# Patient Record
Sex: FEMALE | Race: WHITE | Employment: UNEMPLOYED | URBAN - NONMETROPOLITAN AREA
[De-identification: names, ages, dates, MRNs, and addresses within clinical notes are randomized per-mention and may not be internally consistent; named-entity substitution may affect disease eponyms.]

---

## 2022-01-03 ENCOUNTER — TELEPHONE (OUTPATIENT)
Dept: FAMILY MEDICINE CLINIC | Age: 67
End: 2022-01-03

## 2022-01-03 NOTE — TELEPHONE ENCOUNTER
Patient presented to window at 2:30pm for 1:20pm appt. Had times mixed up. Rescheduled for next available, 1/17/22.

## 2022-01-17 ENCOUNTER — OFFICE VISIT (OUTPATIENT)
Dept: FAMILY MEDICINE CLINIC | Age: 67
End: 2022-01-17
Payer: MEDICARE

## 2022-01-17 VITALS
BODY MASS INDEX: 20.02 KG/M2 | HEIGHT: 57 IN | SYSTOLIC BLOOD PRESSURE: 102 MMHG | HEART RATE: 70 BPM | OXYGEN SATURATION: 98 % | DIASTOLIC BLOOD PRESSURE: 60 MMHG | WEIGHT: 92.8 LBS | TEMPERATURE: 97.2 F

## 2022-01-17 DIAGNOSIS — F32.A DEPRESSION, UNSPECIFIED DEPRESSION TYPE: ICD-10-CM

## 2022-01-17 DIAGNOSIS — Z12.31 ENCOUNTER FOR SCREENING MAMMOGRAM FOR BREAST CANCER: ICD-10-CM

## 2022-01-17 DIAGNOSIS — Z13.1 DIABETES MELLITUS SCREENING: ICD-10-CM

## 2022-01-17 DIAGNOSIS — Z78.0 POST-MENOPAUSAL: ICD-10-CM

## 2022-01-17 DIAGNOSIS — F41.9 ANXIETY: Primary | ICD-10-CM

## 2022-01-17 DIAGNOSIS — N89.8 VAGINAL DISCHARGE: ICD-10-CM

## 2022-01-17 DIAGNOSIS — Z13.220 LIPID SCREENING: ICD-10-CM

## 2022-01-17 DIAGNOSIS — M77.8 RIGHT SHOULDER TENDINITIS: ICD-10-CM

## 2022-01-17 DIAGNOSIS — Z12.11 SCREENING FOR COLON CANCER: ICD-10-CM

## 2022-01-17 PROCEDURE — G8420 CALC BMI NORM PARAMETERS: HCPCS | Performed by: NURSE PRACTITIONER

## 2022-01-17 PROCEDURE — 4040F PNEUMOC VAC/ADMIN/RCVD: CPT | Performed by: NURSE PRACTITIONER

## 2022-01-17 PROCEDURE — 3017F COLORECTAL CA SCREEN DOC REV: CPT | Performed by: NURSE PRACTITIONER

## 2022-01-17 PROCEDURE — 99212 OFFICE O/P EST SF 10 MIN: CPT | Performed by: NURSE PRACTITIONER

## 2022-01-17 PROCEDURE — 4004F PT TOBACCO SCREEN RCVD TLK: CPT | Performed by: NURSE PRACTITIONER

## 2022-01-17 PROCEDURE — G8484 FLU IMMUNIZE NO ADMIN: HCPCS | Performed by: NURSE PRACTITIONER

## 2022-01-17 PROCEDURE — G8427 DOCREV CUR MEDS BY ELIG CLIN: HCPCS | Performed by: NURSE PRACTITIONER

## 2022-01-17 PROCEDURE — 1123F ACP DISCUSS/DSCN MKR DOCD: CPT | Performed by: NURSE PRACTITIONER

## 2022-01-17 PROCEDURE — 99203 OFFICE O/P NEW LOW 30 MIN: CPT | Performed by: NURSE PRACTITIONER

## 2022-01-17 PROCEDURE — G8400 PT W/DXA NO RESULTS DOC: HCPCS | Performed by: NURSE PRACTITIONER

## 2022-01-17 PROCEDURE — 1090F PRES/ABSN URINE INCON ASSESS: CPT | Performed by: NURSE PRACTITIONER

## 2022-01-17 ASSESSMENT — PATIENT HEALTH QUESTIONNAIRE - PHQ9
SUM OF ALL RESPONSES TO PHQ QUESTIONS 1-9: 0
2. FEELING DOWN, DEPRESSED OR HOPELESS: 0
SUM OF ALL RESPONSES TO PHQ QUESTIONS 1-9: 0
SUM OF ALL RESPONSES TO PHQ9 QUESTIONS 1 & 2: 0
SUM OF ALL RESPONSES TO PHQ QUESTIONS 1-9: 0
SUM OF ALL RESPONSES TO PHQ QUESTIONS 1-9: 0
1. LITTLE INTEREST OR PLEASURE IN DOING THINGS: 0

## 2022-01-17 NOTE — PROGRESS NOTES
[address] [facility]  [address] [facility], [id number]  [phone]      HPI:     Shoulder Pain   The pain is present in the right arm and right shoulder. This is a new problem. The current episode started more than 1 month ago. There has been no history of extremity trauma. The problem occurs daily. The problem has been unchanged. The quality of the pain is described as aching and sharp. The pain is at a severity of 5/10. The pain is moderate. Associated symptoms include a limited range of motion and stiffness. Pertinent negatives include no fever, numbness or tingling. The symptoms are aggravated by activity. She has tried acetaminophen and NSAIDS for the symptoms. The treatment provided no relief. Pt presents to the clinic to establish care. She has a significant history of anxiety and depression. She has a history of bipolar disease but does not think that she is having mood swings like she was. She denies thoughts of suicide or homicide. She was seeing a counselor and psychiatrist in the past but has not done so for several years. She is struggling with her anxiety and having trouble sleeping. She does report vaginal drainage over the last few months. There is no color or smell to the drainage. She denies itching or painful intercourse. She is due for a pap. She is due for labs. She is agreeable to update health maintenance. Current Outpatient Medications   Medication Sig Dispense Refill    ibuprofen (ADVIL;MOTRIN) 800 MG tablet Take 1 tablet by mouth every 8 hours as needed for Pain (with food). (Patient not taking: Reported on 1/17/2022) 90 tablet 0     No current facility-administered medications for this visit. No Known Allergies    All patients pastmedical, surgical, social and family history has been reviewed. Subjective:      Review of Systems   Constitutional: Negative for activity change, appetite change, fatigue and fever.    Respiratory: Negative for cough, shortness of breath and wheezing. Cardiovascular: Negative for chest pain and palpitations. Musculoskeletal: Positive for stiffness. Right shoulder pain   Skin: Negative. Neurological: Negative for dizziness, tingling, light-headedness, numbness and headaches. Psychiatric/Behavioral: Positive for sleep disturbance. Negative for self-injury and suicidal ideas. The patient is nervous/anxious. Objective:      Physical Exam  Vitals and nursing note reviewed. Constitutional:       Appearance: Normal appearance. HENT:      Head: Normocephalic and atraumatic. Cardiovascular:      Rate and Rhythm: Normal rate and regular rhythm. Heart sounds: Normal heart sounds. Pulmonary:      Effort: Pulmonary effort is normal.      Breath sounds: Normal breath sounds. Musculoskeletal:      Right shoulder: Tenderness present. Decreased range of motion. Decreased strength. Skin:     Capillary Refill: Capillary refill takes less than 2 seconds. Neurological:      General: No focal deficit present. Mental Status: She is alert and oriented to person, place, and time. Psychiatric:         Mood and Affect: Mood is anxious. Behavior: Behavior normal.         Thought Content: Thought content normal.         Cognition and Memory: Cognition and memory normal.         Judgment: Judgment normal.          Assessment:       Diagnosis Orders   1. Anxiety  TSH with Reflex    External Referral To Psychiatry   2. Depression, unspecified depression type  External Referral To Psychiatry   3. Right shoulder tendinitis  [facility] [place]   4. Post-menopausal  DEXA Bone Density Axial Skeleton    CBC Auto Differential   5. Vaginal discharge  [facility]   6. Lipid screening  Lipid Panel   7. Diabetes mellitus screening  Comprehensive Metabolic Panel   8. Encounter for screening mammogram for breast cancer  [facility] PARVIN DIGITAL SCREEN BILATERAL [[id number]]   9.  Screening for colon cancer  Fecal DNA Colorectal cancer screening (Cologuard)       Plan:      Anxiety-will refer to psychiatry given pt's history and intolerance of different medications  Depression-same as above labs ordered  Right shoulder tendinitis-will refer to PT for eval  Post-menopausal-dexa scan ordered  Vaginal discharge-refer to OB/GYN for pap  fasting labs ordered  Screening mammogram ordered  cologuard ordered  Pt to return in 3 months for follow up   Pt to return PRN   Return in about 3 months (around 4/17/2022).   Orders Placed This Encounter   Procedures    Fecal DNA Colorectal cancer screening (Cologuard)    [facility] PARVIN DIGITAL SCREEN BILATERAL [[id number]]     Standing Status:   Future     Standing Expiration Date:   1/17/2023     Order Specific Question:   Reason for exam:     Answer:   screening    DEXA Bone Density Axial Skeleton     Standing Status:   Future     Standing Expiration Date:   1/17/2023     Order Specific Question:   Reason for exam:     Answer:   post-menopausal    CBC Auto Differential     Standing Status:   Future     Standing Expiration Date:   1/18/2023    Comprehensive Metabolic Panel     Standing Status:   Future     Standing Expiration Date:   1/18/2023    Lipid Panel     Standing Status:   Future     Standing Expiration Date:   1/18/2023     Order Specific Question:   Is Patient Fasting?/# of Hours     Answer:   15    TSH with Reflex     Standing Status:   Future     Standing Expiration Date:   1/18/2023    External Referral To Psychiatry     Referral Priority:   Routine     Referral Type:   Eval and Treat     Referral Reason:   Specialty Services Required     Requested Specialty:   Psychiatry     Number of Visits Requested:   [facility]     Referral Priority:   Routine     Referral Type:   Eval and Treat     Referral Reason:   Specialty Services Required     Requested Specialty:   Obstetrics & Gynecology     Number of Visits Requested:   [address] [place] [address] Therapy [facility] [place]     Referral Priority:   Routine     Referral Type:   Eval and Treat     Referral Reason:   Specialty Services Required     Requested Specialty:   Physical Therapy     Number of Visits Requested:   1     No orders of the defined types were placed in this encounter. Patient given educational materials - see patient instructions. All patient questionsanswered. Pt voiced understanding. Reviewed health maintenance.      Electronically signed by ALFONZO Mckay CNP, CNP on 1/24/2022 at 11:59 AM

## 2022-01-20 ENCOUNTER — HOSPITAL ENCOUNTER (OUTPATIENT)
Dept: PHYSICAL THERAPY | Age: 67
Setting detail: THERAPIES SERIES
Discharge: HOME | End: 2022-01-20
Payer: MEDICARE

## 2022-01-20 PROCEDURE — 97110 THERAPEUTIC EXERCISES: CPT | Performed by: PHYSICAL THERAPIST

## 2022-01-20 PROCEDURE — 97161 PT EVAL LOW COMPLEX 20 MIN: CPT | Performed by: PHYSICAL THERAPIST

## 2022-01-20 ASSESSMENT — PAIN DESCRIPTION - PAIN TYPE: TYPE: CHRONIC PAIN

## 2022-01-20 ASSESSMENT — PAIN DESCRIPTION - FREQUENCY: FREQUENCY: CONTINUOUS

## 2022-01-20 ASSESSMENT — PAIN DESCRIPTION - ORIENTATION: ORIENTATION: RIGHT

## 2022-01-20 ASSESSMENT — PAIN DESCRIPTION - DESCRIPTORS: DESCRIPTORS: ACHING;THROBBING

## 2022-01-20 ASSESSMENT — PAIN DESCRIPTION - ONSET: ONSET: PROGRESSIVE

## 2022-01-20 ASSESSMENT — PAIN DESCRIPTION - LOCATION: LOCATION: ARM;SHOULDER

## 2022-01-20 ASSESSMENT — PAIN - FUNCTIONAL ASSESSMENT: PAIN_FUNCTIONAL_ASSESSMENT: PREVENTS OR INTERFERES WITH ALL ACTIVE AND SOME PASSIVE ACTIVITIES

## 2022-01-20 ASSESSMENT — PAIN DESCRIPTION - PROGRESSION: CLINICAL_PROGRESSION: GRADUALLY WORSENING

## 2022-01-20 ASSESSMENT — PAIN SCALES - GENERAL: PAINLEVEL_OUTOF10: 9
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
## 2022-01-20 NOTE — PLAN OF CARE
[facility]    [x] [place]  Phone: [phone]  Fax: [phone]      [] [place]  Phone: [phone]  Fax: [phone]        To: Referring Practitioner: Niki Marcelo CNP      Patient: Marlyn Shetty  : 1955   MRN: [id number]  Evaluation Date: 2022      Diagnosis Information:  · Diagnosis: M77.8 R shld tendinitis   · Treatment Diagnosis: M77.8 L shld tendinitis     Physical Therapy Certification Form  Dear Niki Marcelo CNP  The following patient has been evaluated for physical therapy services and for therapy to continue, Medicare requires monthly physician review of the treatment plan. Please review the attached evaluation and/or summary of the patient's plan of care, and verify that you agree therapy should continue by signing the attached document and sending it back to our office.     Plan of Care/Treatment to date:  [x] Therapeutic Exercise    [] Modalities:  [] Therapeutic Activity     [] Ultrasound  [x] Electrical Stimulation  [] Gait Training      [] Cervical Traction [] Lumbar Traction  [] Neuromuscular Re-education    [] Cold/hotpack [] Iontophoresis   [x] Instruction in HEP     Other:  [x] Manual Therapy      []             [] Aquatic Therapy      []                 Goals:  Short term goals  Time Frame for Short term goals: 1 week  Short term goal 1: Start HEP    Long term goals  Time Frame for Long term goals : 4 week  Long term goal 1: R shld pain controlled at 2/10 to allow regular ADL  Long term goal 2: AROM 170 deg flex, abd, behind the back to T11, behind the head to T3 for efficient ADL  Long term goal 3: 4+/5 strength for proper lifting tolerance  Long term goal 4: Sleep thru 90% of the night    Frequency/Duration: 22 - 22  # Days per week: [] 1 day # Weeks: [] 1 week [] 5 weeks     [] 2 days   [] 2 weeks [] 6 weeks     [x] 3 days   [] 3 weeks [] 7 weeks     [] 4 days   [x] 4 weeks [] 8 weeks    Rehab Potential: [] Excellent [x] Good [] Fair  [] Poor     Electronically signed by:  Pat Munguia PT      If you have any questions or concerns, please don't hesitate to call.   Thank you for your referral.      Physician Signature:________________________________Date:__________________  By signing above, therapists plan is approved by physician

## 2022-01-20 NOTE — FLOWSHEET NOTE
Physical Therapy Daily Treatment Note    Date:  2022    Patient Name:  Marley Post    :  1955  MRN: [id number]  Restrictions/Precautions:     Medical/Treatment Diagnosis Information:   · Diagnosis: M77.8 R shld tendinitis  · Treatment Diagnosis: M77.8 L shld tendinitis  Insurance/Certification information:  PT Insurance Information: [facility] Medicare  Physician Information:  Referring Practitioner: Anna Avalos CNP  Plan of care signed (Y/N):  n  Visit# / total visits:   Pain level: 10/10       Time In:3:00   Time Out:3:40    Progress Note: [x]  Yes  []  No  Next due by: Visit #12, or 22      Subjective:  See eval     Objective: See eval  Observation:   Test measurements:      Exercises:   Exercise/Equipment Resistance/Repetitions Other comments   Supine pect stretch 3' On  roll   Shld extension stretch 10x    Int rotation stretch 10x    B rowing/ extension     6-way t-band     Flex to 90 deg     Scaption up/down          Prone scap program      Ext rotate in sidely 15x    Abd in sidely 10x         ROM          [x] Provided verbal/tactile cueing for activities related to strengthening, flexibility, endurance, ROM. (10460)  [] Provided verbal/tactile cueing for activities related to improving balance, coordination, kinesthetic sense, posture, motor skill, proprioception. (66651)    Therapeutic Activities:     [] Therapeutic activities, direct (one-on-one) patient contact (use of dynamic activities to improve functional performance). (11102)    Gait:   [] Provided training and instruction to the patient for ambulation re-education. (59314)    Self-Care/ADL's  [] Self-care/home management training and compensatory training, meal preparation, safety procedures, and instructions in use of assistive technology devices/adaptive equipment, direct one-on-one contact.  (57195)    Home Exercise Program: Impingement reduction program : pect, ext, int rot stretch    [x] Reviewed/Progressed HEP activities related to strengthening, flexibility, endurance, ROM. (73281)  [] Reviewed/Progressed HEP activities related to improving balance, coordination, kinesthetic sense, posture, motor skill, proprioception.  (61172)    Manual Treatments:    [] Provided manual therapy to mobilize soft tissue/joints for the purpose of modulating pain, promoting relaxation,  increasing ROM, reducing/eliminating soft tissue swelling/inflammation/restriction, improving soft tissue extensibility. (13641)    Service Based Modalities:  Eval 25'    Timed Code Treatment Minutes:    There ex/ HEP 15'    Total Treatment Minutes:  36'     Treatment/Activity Tolerance:  [x] Patient tolerated treatment well [] Patient limited by fatique  [] Patient limited by pain  [] Patient limited by other medical complications  [] Other:     Prognosis: [x] Good [] Fair  [] Poor    Patient Requires Follow-up: [x] Yes  [] No      Goals:  Short term goals  Time Frame for Short term goals: 1 week  Short term goal 1: Start HEP    Long term goals  Time Frame for Long term goals : 4 week  Long term goal 1: R shld pain controlled at 2/10 to allow regular ADL  Long term goal 2: AROM 170 deg flex, abd, behind the back to T11, behind the head to T3 for efficient ADL  Long term goal 3: 4+/5 strength for proper lifting tolerance  Long term goal 4: Sleep thru 90% of the night          Plan:   [] Continue per plan of care [] Alter current plan (see comments)  [x] Plan of care initiated [] Hold pending MD visit [] Discharge  Plan for Next Session:  VIA [facility] [place]    Electronically signed by:  Kike Camp PT,PT

## 2022-01-24 ENCOUNTER — HOSPITAL ENCOUNTER (OUTPATIENT)
Dept: PHYSICAL THERAPY | Age: 67
Setting detail: THERAPIES SERIES
Discharge: HOME | End: 2022-01-24
Payer: MEDICARE

## 2022-01-24 PROCEDURE — 97110 THERAPEUTIC EXERCISES: CPT

## 2022-01-24 ASSESSMENT — ENCOUNTER SYMPTOMS
COUGH: 0
WHEEZING: 0
SHORTNESS OF BREATH: 0

## 2022-01-24 NOTE — FLOWSHEET NOTE
Physical Therapy Daily Treatment Note    Date:  2022    Patient Name:  Renny Sevilla    :  1955  MRN: [id number]  Restrictions/Precautions:     Medical/Treatment Diagnosis Information:   · Diagnosis: M77.8 R shld tendinitis  · Treatment Diagnosis: M77.8 L shld tendinitis  Insurance/Certification information:  PT Insurance Information: [facility] Medicare  Physician Information:  Referring Practitioner: James Pacheco CNP  Plan of care signed (Y/N):  y  Visit# / total visits:   Pain level: 0/10       Time In: 1:10   Time Out:1:41    Progress Note: []  Yes  [x]  No  Next due by: Visit #12, or 22      Subjective:  Pt states has been completing HEP. Pt states shoulder feels good this date. Objective: YRN performed per flow sheet for increased mobility and strengthening to decrease pain and improve daily living tasks. Verbal cueing provided for sequencing and proper form. Pt declined initiation of IFC this date due to no pain. ADD  Shoulder endurance exercises     Observation:    Test measurements:  AROM R shoulder ER: C5     Exercises:   Exercise/Equipment Resistance/Repetitions Other comments   Supine pect stretch 3' On  roll   Shld extension stretch 10x    Int rotation stretch 10x    B rowing/ extension 10x red    6-way t-band 10x red    Flex to 90 deg 10x    Scaption up/down 10x         Prone scap program 10x     Ext rotate in sidely 15x    Abd in sidely 15x    Supine ABCs 1x              ROM 5'         [x] Provided verbal/tactile cueing for activities related to strengthening, flexibility, endurance, ROM. (47306)  [] Provided verbal/tactile cueing for activities related to improving balance, coordination, kinesthetic sense, posture, motor skill, proprioception. (34288)    Therapeutic Activities:     [] Therapeutic activities, direct (one-on-one) patient contact (use of dynamic activities to improve functional performance).  (04410)    Gait:   [] Provided training and instruction to the patient for ambulation re-education. (18721)    Self-Care/ADL's  [] Self-care/home management training and compensatory training, meal preparation, safety procedures, and instructions in use of assistive technology devices/adaptive equipment, direct one-on-one contact. (99526)    Home Exercise Program: Impingement reduction program : pect, ext, int rot stretch    [x] Reviewed/Progressed HEP activities related to strengthening, flexibility, endurance, ROM. (58376)  [] Reviewed/Progressed HEP activities related to improving balance, coordination, kinesthetic sense, posture, motor skill, proprioception.  (93649)    Manual Treatments:    [] Provided manual therapy to mobilize soft tissue/joints for the purpose of modulating pain, promoting relaxation,  increasing ROM, reducing/eliminating soft tissue swelling/inflammation/restriction, improving soft tissue extensibility. (15022)    Service Based Modalities:      Timed Code Treatment Minutes:    There ex 31'    Total Treatment Minutes:  32'     Treatment/Activity Tolerance:  [x] Patient tolerated treatment well [] Patient limited by fatique  [] Patient limited by pain  [] Patient limited by other medical complications  [] Other:     Prognosis: [x] Good [] Fair  [] Poor    Patient Requires Follow-up: [x] Yes  [] No      Goals:  Short term goals  Time Frame for Short term goals: 1 week  Short term goal 1: Start HEP    Long term goals  Time Frame for Long term goals : 4 week  Long term goal 1: R shld pain controlled at 2/10 to allow regular ADL  Long term goal 2: AROM 170 deg flex, abd, behind the back to T11, behind the head to T3 for efficient ADL  Long term goal 3: 4+/5 strength for proper lifting tolerance  Long term goal 4: Sleep thru 90% of the night          Plan:   [x] Continue per plan of care [] Alter current plan (see comments)  [] Plan of care initiated [] Hold pending MD visit [] Discharge    Plan for Next Session:  VIA [facility] [place]    Electronically signed by:  Alvarado Marlow Pérez, PTA

## 2022-01-25 NOTE — PROGRESS NOTES
I have reviewed and agree to the content of the note written by the PTA.   Electronically signed by Nicky Councilman PT [id number]

## 2022-01-26 ENCOUNTER — HOSPITAL ENCOUNTER (OUTPATIENT)
Dept: PHYSICAL THERAPY | Age: 67
Setting detail: THERAPIES SERIES
Discharge: HOME | End: 2022-01-26
Payer: MEDICARE

## 2022-01-26 DIAGNOSIS — Z13.6 ENCOUNTER FOR SCREENING FOR CARDIOVASCULAR DISORDERS: ICD-10-CM

## 2022-01-26 DIAGNOSIS — Z83.438 FAMILY HISTORY OF MIXED HYPERLIPIDEMIA: ICD-10-CM

## 2022-01-26 DIAGNOSIS — Z78.0 POST-MENOPAUSAL: ICD-10-CM

## 2022-01-26 DIAGNOSIS — N89.8 VAGINAL DISCHARGE: Primary | ICD-10-CM

## 2022-01-26 PROCEDURE — 97110 THERAPEUTIC EXERCISES: CPT

## 2022-01-26 NOTE — FLOWSHEET NOTE
Physical Therapy Daily Treatment Note    Date:  2022    Patient Name:  Brittany Augustin    :  1955  MRN: [id number]  Restrictions/Precautions:     Medical/Treatment Diagnosis Information:   · Diagnosis: M77.8 R shld tendinitis  · Treatment Diagnosis: M77.8 L shld tendinitis  Insurance/Certification information:  PT Insurance Information: [facility] Medicare  Physician Information:  Referring Practitioner: Hayden Lyn CNP  Plan of care signed (Y/N):  y  Visit# / total visits:  3/12  Pain level: 0/10       Time In: 1:50   Time Out: 2:16    Progress Note: []  Yes  [x]  No  Next due by: Visit #12, or 22      Subjective:  Pt states has been completing HEP. No pain on this date. Notes since she started she has had a lot less pain and can tell it is working. Objective: YRN performed per flow sheet for increased mobility and strengthening to decrease pain and improve daily living tasks. Patient tolerated added 1# to exercises. No rest breaks required. Observation:    Test measurements:  AROM R shoulder ER: C5     Exercises:   Exercise/Equipment Resistance/Repetitions Other comments   Supine pect stretch 3' On  roll   Shld extension stretch 10x    Int rotation stretch 10x    B rowing/ extension 10x red    6-way t-band 10x red    Flex to 90 deg 10x, 1#    Scaption up/down 10x, 1#         Prone scap program 10x, 1#    Ext rotate in sidely 15x, 1#    Abd in sidely 15x, 1#    Supine ABCs 1x, 1#    Supine overhead reach 15x         ROM 5'         [x] Provided verbal/tactile cueing for activities related to strengthening, flexibility, endurance, ROM. (52686)  [] Provided verbal/tactile cueing for activities related to improving balance, coordination, kinesthetic sense, posture, motor skill, proprioception. (96257)    Therapeutic Activities:     [] Therapeutic activities, direct (one-on-one) patient contact (use of dynamic activities to improve functional performance).  (22886)    Gait:   [] Provided training and instruction to the patient for ambulation re-education. (17206)    Self-Care/ADL's  [] Self-care/home management training and compensatory training, meal preparation, safety procedures, and instructions in use of assistive technology devices/adaptive equipment, direct one-on-one contact. (52865)    Home Exercise Program: Impingement reduction program : pect, ext, int rot stretch    [x] Reviewed/Progressed HEP activities related to strengthening, flexibility, endurance, ROM. (13246)  [] Reviewed/Progressed HEP activities related to improving balance, coordination, kinesthetic sense, posture, motor skill, proprioception.  (18241)    Manual Treatments:    [] Provided manual therapy to mobilize soft tissue/joints for the purpose of modulating pain, promoting relaxation,  increasing ROM, reducing/eliminating soft tissue swelling/inflammation/restriction, improving soft tissue extensibility. (05266)    Service Based Modalities:      Timed Code Treatment Minutes:    There ex 26'    Total Treatment Minutes:  26'     Treatment/Activity Tolerance:  [x] Patient tolerated treatment well [] Patient limited by fatique  [] Patient limited by pain  [] Patient limited by other medical complications  [] Other:     Prognosis: [x] Good [] Fair  [] Poor    Patient Requires Follow-up: [x] Yes  [] No      Goals:  Short term goals  Time Frame for Short term goals: 1 week  Short term goal 1: Start HEP    Long term goals  Time Frame for Long term goals : 4 week  Long term goal 1: R shld pain controlled at 2/10 to allow regular ADL (no pain)  Long term goal 2: AROM 170 deg flex, abd, behind the back to T11, behind the head to T3 for efficient ADL  Long term goal 3: 4+/5 strength for proper lifting tolerance (ongoing)  Long term goal 4: Sleep thru 90% of the night (not waking from pain)      Plan:   [x] Continue per plan of care [] Alter current plan (see comments)  [] Plan of care initiated [] Hold pending MD visit [] Discharge    Plan for Next Session:      Electronically signed by:  Bert Godinez PTA

## 2022-01-27 NOTE — PROGRESS NOTES
I have reviewed and agree to the content of the note written by the PTA.   Electronically signed by Gina Shea PT [id number]

## 2022-01-28 ENCOUNTER — HOSPITAL ENCOUNTER (OUTPATIENT)
Dept: PHYSICAL THERAPY | Age: 67
Setting detail: THERAPIES SERIES
Discharge: HOME | End: 2022-01-28
Payer: MEDICARE

## 2022-01-28 PROCEDURE — 97110 THERAPEUTIC EXERCISES: CPT

## 2022-01-28 NOTE — FLOWSHEET NOTE
Physical Therapy Daily Treatment Note    Date:  2022    Patient Name:  Candace Daily    :  1955  MRN: [id number]  Restrictions/Precautions:     Medical/Treatment Diagnosis Information:   · Diagnosis: M77.8 R shld tendinitis  · Treatment Diagnosis: M77.8 L shld tendinitis  Insurance/Certification information:  PT Insurance Information: [place] [facility]  Physician Information:  Referring Practitioner: Perlita Vinson CNP  Plan of care signed (Y/N):  y  Visit# / total visits:    Pain level: 0/10       Time In: 11:00   Time Out: 11:28    Progress Note: []  Yes  [x]  No  Next due by: Visit #12, or 22      Subjective:  Pt states has been completing HEP. No pain on this date. Notes since she started she has had a lot less pain and can tell it is working. Objective: YRN performed per flow sheet for increased mobility and strengthening to decrease pain and improve daily living tasks. No rest breaks required. Increased reps this date. Pt fatigued some with prone scap program this date. Spoke with pt about ROM being WNL and focusing now on shoulder strength and endurance      Observation:    Test measurements:  AROM R shoulder all WNL and no pain    Exercises:   Exercise/Equipment Resistance/Repetitions Other comments   Supine pect stretch 3' On /2 roll   Shld extension stretch 10x  on E stim box   Int rotation stretch 10x    B rowing/ extension 15x red    6-way t-band 15x red    Flex to 90 deg 15x, 1#    Scaption up/down 15x, 1#         Prone scap program 15x, 1#    Ext rotate in sidely 15x, 1#    Abd in sidely 15x, 1#    Supine ABCs 1x, 1#    Supine overhead reach 15x    Serratus punches 10x         ROM 5'         [x] Provided verbal/tactile cueing for activities related to strengthening, flexibility, endurance, ROM. (88418)  [] Provided verbal/tactile cueing for activities related to improving balance, coordination, kinesthetic sense, posture, motor skill, proprioception. (48614)    Therapeutic Activities:     [] Therapeutic activities, direct (one-on-one) patient contact (use of dynamic activities to improve functional performance). (26923)    Gait:   [] Provided training and instruction to the patient for ambulation re-education. (71055)    Self-Care/ADL's  [] Self-care/home management training and compensatory training, meal preparation, safety procedures, and instructions in use of assistive technology devices/adaptive equipment, direct one-on-one contact. (67838)    Home Exercise Program: Impingement reduction program : pect, ext, int rot stretch    [x] Reviewed/Progressed HEP activities related to strengthening, flexibility, endurance, ROM. (46549)  [] Reviewed/Progressed HEP activities related to improving balance, coordination, kinesthetic sense, posture, motor skill, proprioception.  (99164)    Manual Treatments:    [] Provided manual therapy to mobilize soft tissue/joints for the purpose of modulating pain, promoting relaxation,  increasing ROM, reducing/eliminating soft tissue swelling/inflammation/restriction, improving soft tissue extensibility. (77245)    Service Based Modalities:      Timed Code Treatment Minutes:    There ex 28'    Total Treatment Minutes:  29'     Treatment/Activity Tolerance:  [x] Patient tolerated treatment well [] Patient limited by fatique  [] Patient limited by pain  [] Patient limited by other medical complications  [] Other:     Prognosis: [x] Good [] Fair  [] Poor    Patient Requires Follow-up: [x] Yes  [] No      Goals:  Short term goals  Time Frame for Short term goals: 1 week  Short term goal 1: Start HEP    Long term goals  Time Frame for Long term goals : 4 week  Long term goal 1: R shld pain controlled at 2/10 to allow regular ADL (no pain)  Long term goal 2: AROM 170 deg flex, abd, behind the back to T11, behind the head to T3 for efficient ADL (see above)  Long term goal 3: 4+/5 strength for proper lifting tolerance   Long term goal 4: Sleep thru 90% of the night (not waking from pain)      Plan:   [x] Continue per plan of care [] Alter current plan (see comments)  [] Plan of care initiated [] Hold pending MD visit [] Discharge    Plan for Next Session:      Electronically signed by:  Lola Borden PTA

## 2022-01-31 ENCOUNTER — HOSPITAL ENCOUNTER (OUTPATIENT)
Dept: LAB | Age: 67
Discharge: HOME | End: 2022-01-31
Payer: MEDICARE

## 2022-01-31 DIAGNOSIS — Z13.6 ENCOUNTER FOR SCREENING FOR CARDIOVASCULAR DISORDERS: ICD-10-CM

## 2022-01-31 DIAGNOSIS — Z83.438 FAMILY HISTORY OF MIXED HYPERLIPIDEMIA: ICD-10-CM

## 2022-01-31 DIAGNOSIS — N89.8 VAGINAL DISCHARGE: ICD-10-CM

## 2022-01-31 DIAGNOSIS — Z78.0 POST-MENOPAUSAL: ICD-10-CM

## 2022-01-31 DIAGNOSIS — F41.9 ANXIETY: ICD-10-CM

## 2022-01-31 LAB
ABSOLUTE EOS #: 0.08 K/UL (ref 0–0.44)
ABSOLUTE IMMATURE GRANULOCYTE: 0.04 K/UL (ref 0–0.3)
ABSOLUTE LYMPH #: 2.65 K/UL (ref 1.1–3.7)
ABSOLUTE MONO #: 0.53 K/UL (ref 0.1–1.2)
BASOPHILS # BLD: 1 % (ref 0–2)
BASOPHILS ABSOLUTE: 0.04 K/UL (ref 0–0.2)
DIFFERENTIAL TYPE: ABNORMAL
EOSINOPHILS RELATIVE PERCENT: 1 % (ref 1–4)
HCT VFR BLD CALC: 43.6 % (ref 36.3–47.1)
HEMOGLOBIN: 14.2 G/DL (ref 11.9–15.1)
IMMATURE GRANULOCYTES: 1 %
LYMPHOCYTES # BLD: 30 % (ref 24–43)
MCH RBC QN AUTO: 31.5 PG (ref 25.2–33.5)
MCHC RBC AUTO-ENTMCNC: 32.6 G/DL (ref 25.2–33.5)
MCV RBC AUTO: 96.7 FL (ref 82.6–102.9)
MONOCYTES # BLD: 6 % (ref 3–12)
NRBC AUTOMATED: 0 PER 100 WBC
PDW BLD-RTO: 12.3 % (ref 11.8–14.4)
PLATELET # BLD: 278 K/UL (ref 138–453)
PLATELET ESTIMATE: ABNORMAL
PMV BLD AUTO: 11 FL (ref 8.1–13.5)
RBC # BLD: 4.51 M/UL (ref 3.95–5.11)
RBC # BLD: ABNORMAL 10*6/UL
SEG NEUTROPHILS: 61 % (ref 36–65)
SEGMENTED NEUTROPHILS ABSOLUTE COUNT: 5.4 K/UL (ref 1.5–8.1)
TSH SERPL DL<=0.05 MIU/L-ACNC: 1.39 MIU/L (ref 0.3–5)
WBC # BLD: 8.7 K/UL (ref 3.5–11.3)
WBC # BLD: ABNORMAL 10*3/UL

## 2022-01-31 PROCEDURE — 85025 COMPLETE CBC W/AUTO DIFF WBC: CPT

## 2022-01-31 PROCEDURE — 80061 LIPID PANEL: CPT

## 2022-01-31 PROCEDURE — 84443 ASSAY THYROID STIM HORMONE: CPT

## 2022-01-31 PROCEDURE — 36415 COLL VENOUS BLD VENIPUNCTURE: CPT

## 2022-01-31 NOTE — PROGRESS NOTES
I have reviewed and agree to the content of the note written by the PTA.   Electronically signed by Fabiola Fletcher PT [id number]

## 2022-02-01 ENCOUNTER — HOSPITAL ENCOUNTER (OUTPATIENT)
Dept: PHYSICAL THERAPY | Age: 67
Setting detail: THERAPIES SERIES
Discharge: HOME | End: 2022-02-01
Payer: MEDICARE

## 2022-02-01 LAB
CHOLESTEROL/HDL RATIO: 3.2
CHOLESTEROL: 196 MG/DL
HDLC SERPL-MCNC: 62 MG/DL
LDL CHOLESTEROL: 104 MG/DL (ref 0–130)
TRIGL SERPL-MCNC: 150 MG/DL
VLDLC SERPL CALC-MCNC: ABNORMAL MG/DL (ref 1–30)

## 2022-02-01 PROCEDURE — 97110 THERAPEUTIC EXERCISES: CPT

## 2022-02-01 NOTE — FLOWSHEET NOTE
Activities:     [] Therapeutic activities, direct (one-on-one) patient contact (use of dynamic activities to improve functional performance). (76977)    Gait:   [] Provided training and instruction to the patient for ambulation re-education. (79703)    Self-Care/ADL's  [] Self-care/home management training and compensatory training, meal preparation, safety procedures, and instructions in use of assistive technology devices/adaptive equipment, direct one-on-one contact. (68767)    Home Exercise Program: Impingement reduction program : pect, ext, int rot stretch    [x] Reviewed/Progressed HEP activities related to strengthening, flexibility, endurance, ROM. (69588)  [] Reviewed/Progressed HEP activities related to improving balance, coordination, kinesthetic sense, posture, motor skill, proprioception.  (17405)    Manual Treatments:    [] Provided manual therapy to mobilize soft tissue/joints for the purpose of modulating pain, promoting relaxation,  increasing ROM, reducing/eliminating soft tissue swelling/inflammation/restriction, improving soft tissue extensibility. (81427)    Service Based Modalities:      Timed Code Treatment Minutes:    There ex 26'    Total Treatment Minutes:  26'     Treatment/Activity Tolerance:  [x] Patient tolerated treatment well [] Patient limited by fatique  [] Patient limited by pain  [] Patient limited by other medical complications  [] Other:     Prognosis: [x] Good [] Fair  [] Poor    Patient Requires Follow-up: [x] Yes  [] No      Goals:  Short term goals  Time Frame for Short term goals: 1 week  Short term goal 1: Start HEP    Long term goals  Time Frame for Long term goals : 4 week  Long term goal 1: R shld pain controlled at 2/10 to allow regular ADL (no pain)  Long term goal 2: AROM 170 deg flex, abd, behind the back to T11, behind the head to T3 for efficient ADL (see above)  Long term goal 3: 4+/5 strength for proper lifting tolerance   Long term goal 4: Sleep thru 90% of the night (not waking from pain)      Plan:   [x] Continue per plan of care [] Alter current plan (see comments)  [] Plan of care initiated [] Hold pending MD visit [] Discharge    Plan for Next Session:  Continue to progress.      Electronically signed by:  Carol Mcclain PTA

## 2022-02-02 ENCOUNTER — HOSPITAL ENCOUNTER (OUTPATIENT)
Dept: PHYSICAL THERAPY | Age: 67
Setting detail: THERAPIES SERIES
Discharge: HOME | End: 2022-02-02
Payer: MEDICARE

## 2022-02-02 PROCEDURE — 97110 THERAPEUTIC EXERCISES: CPT

## 2022-02-02 NOTE — FLOWSHEET NOTE
Physical Therapy Daily Treatment Note    Date:  2022    Patient Name:  Kathy Nunez    :  1955  MRN: [id number]  Restrictions/Precautions:     Medical/Treatment Diagnosis Information:   · Diagnosis: M77.8 R shld tendinitis  · Treatment Diagnosis: M77.8 L shld tendinitis  Insurance/Certification information:  PT Insurance Information: [facility] Medicare  Physician Information:  Referring Practitioner: Taylor Fritz CNP  Plan of care signed (Y/N):  y  Visit# / total visits:    Pain level: 0/10       Time In: 10:56   Time Out: 11:29    Progress Note: []  Yes  [x]  No  Next due by: Visit #12, or 22      Subjective:  Patient states she tried to sleep on her side and had a little pain this morning. She is not having any now. The pain continues to be nothing like it was. Objective: YRN performed per flow sheet for increased mobility and strengthening to decrease pain and improve daily living tasks. Added roller to work on functional endurance of R shoulder. Constant verbal cues to reduce speed for working muscle eccentrically. Initiated exercises with good tolerance, increased fatigue noted. Educated on importance of performing exercises at home slowly for proper muscle activation.       Observation:    Test measurements:  AROM R shoulder all WNL and no pain, flexion: 4/5, abduction: 4/5     Exercises:   Exercise/Equipment Resistance/Repetitions Other comments   Supine pect stretch 3' On /2 roll   Shld extension stretch 10x  on E stim box   Int rotation stretch 10x With strap   B rowing/ extension 15x green    6-way t-band 15x green    Flex to 90 deg 15x, 2# vc's for substitution   Scaption up/down 10x, 2#    UBE 5'    Prone scap program 15x, 1#    Ext rotate in sidely 15x, 1#    Abd in sidely 15x, 1#    Standing ABCs 1x, 1#    Supine overhead reach 15x    Serratus punches 10x    Roller on wall 3 directions, 10x    ROM 5'    Cybex 11 25#, x10    [x] Provided verbal/tactile cueing for activities related to strengthening, flexibility, endurance, ROM. (06325)  [] Provided verbal/tactile cueing for activities related to improving balance, coordination, kinesthetic sense, posture, motor skill, proprioception. (51059)    Therapeutic Activities:     [] Therapeutic activities, direct (one-on-one) patient contact (use of dynamic activities to improve functional performance). (47074)    Gait:   [] Provided training and instruction to the patient for ambulation re-education. (40575)    Self-Care/ADL's  [] Self-care/home management training and compensatory training, meal preparation, safety procedures, and instructions in use of assistive technology devices/adaptive equipment, direct one-on-one contact. (79777)    Home Exercise Program: Impingement reduction program : pect, ext, int rot stretch    [x] Reviewed/Progressed HEP activities related to strengthening, flexibility, endurance, ROM. (37061)  [] Reviewed/Progressed HEP activities related to improving balance, coordination, kinesthetic sense, posture, motor skill, proprioception.  (50911)    Manual Treatments:    [] Provided manual therapy to mobilize soft tissue/joints for the purpose of modulating pain, promoting relaxation,  increasing ROM, reducing/eliminating soft tissue swelling/inflammation/restriction, improving soft tissue extensibility. (66299)    Service Based Modalities:      Timed Code Treatment Minutes:    There ex 33'    Total Treatment Minutes:  35'     Treatment/Activity Tolerance:  [x] Patient tolerated treatment well [] Patient limited by fatique  [] Patient limited by pain  [] Patient limited by other medical complications  [] Other:     Prognosis: [x] Good [] Fair  [] Poor    Patient Requires Follow-up: [x] Yes  [] No      Goals:  Short term goals  Time Frame for Short term goals: 1 week  Short term goal 1: Start HEP     Long term goals  Time Frame for Long term goals : 4 week  Long term goal 1: R shld pain controlled at 2/10 to

## 2022-02-03 ENCOUNTER — APPOINTMENT (OUTPATIENT)
Dept: PHYSICAL THERAPY | Age: 67
End: 2022-02-03
Payer: MEDICARE

## 2022-02-04 NOTE — PROGRESS NOTES
I have reviewed and agree to the content of the note written by the PTA.   Electronically signed by Bhavesh Faria PT [id number]

## 2022-02-16 ENCOUNTER — HOSPITAL ENCOUNTER (OUTPATIENT)
Dept: PHYSICAL THERAPY | Age: 67
Setting detail: THERAPIES SERIES
Discharge: HOME | End: 2022-02-16
Payer: MEDICARE

## 2022-02-16 PROCEDURE — 97110 THERAPEUTIC EXERCISES: CPT

## 2022-02-16 NOTE — FLOWSHEET NOTE
sense, posture, motor skill, proprioception. (72635)    Therapeutic Activities:     [] Therapeutic activities, direct (one-on-one) patient contact (use of dynamic activities to improve functional performance). (55144)    Gait:   [] Provided training and instruction to the patient for ambulation re-education. (14237)    Self-Care/ADL's  [] Self-care/home management training and compensatory training, meal preparation, safety procedures, and instructions in use of assistive technology devices/adaptive equipment, direct one-on-one contact. (85517)    Home Exercise Program: Impingement reduction program : pect, ext, int rot stretch    [x] Reviewed/Progressed HEP activities related to strengthening, flexibility, endurance, ROM. (22987)  [] Reviewed/Progressed HEP activities related to improving balance, coordination, kinesthetic sense, posture, motor skill, proprioception.  (50939)    Manual Treatments:    [] Provided manual therapy to mobilize soft tissue/joints for the purpose of modulating pain, promoting relaxation,  increasing ROM, reducing/eliminating soft tissue swelling/inflammation/restriction, improving soft tissue extensibility. (96039)    Service Based Modalities:      Timed Code Treatment Minutes:    There ex 34'    Total Treatment Minutes:  29'     Treatment/Activity Tolerance:  [x] Patient tolerated treatment well [] Patient limited by fatique  [] Patient limited by pain  [] Patient limited by other medical complications  [] Other:     Prognosis: [x] Good [] Fair  [] Poor    Patient Requires Follow-up: [x] Yes  [] No      Goals:  Short term goals  Time Frame for Short term goals: 1 week  Short term goal 1: Start HEP     Long term goals  Time Frame for Long term goals : 4 week  Long term goal 1: R shld pain controlled at 2/10 to allow regular ADL (no pain)  Long term goal 2: AROM 170 deg flex, abd, behind the back to T11, behind the head to T3 for efficient ADL (WNL)  Long term goal 3: 4+/5 strength for proper lifting tolerance (current 4/5)  Long term goal 4: Sleep thru 90% of the night (sleeps through the night, only wakes up occasionally when she rolls on her arm wrong)       Plan:   [x] Continue per plan of care [] Alter current plan (see comments)  [] Plan of care initiated [] Hold pending MD visit [] Discharge    Plan for Next Session:  Continue to progress.      Electronically signed by:  Juan Pablo Levy PTA

## 2022-02-17 NOTE — PROGRESS NOTES
I have reviewed and agree to the content of the note written by the PTA.   Electronically signed by Bhavesh Faria PT [id number]

## 2022-02-21 ENCOUNTER — HOSPITAL ENCOUNTER (OUTPATIENT)
Dept: PHYSICAL THERAPY | Age: 67
Setting detail: THERAPIES SERIES
Discharge: HOME | End: 2022-02-21
Payer: MEDICARE

## 2022-02-21 PROCEDURE — 97110 THERAPEUTIC EXERCISES: CPT

## 2022-02-21 NOTE — FLOWSHEET NOTE
Physical Therapy Daily Treatment Note    Date:  2022    Patient Name:  Chang Redman    :  1955  MRN: [id number]  Restrictions/Precautions:     Medical/Treatment Diagnosis Information:   · Diagnosis: M77.8 R shld tendinitis  · Treatment Diagnosis: M77.8 L shld tendinitis  Insurance/Certification information:  PT Insurance Information: [facility] Medicare  Physician Information:  Referring Practitioner: Liz Dallas CNP  Plan of care signed (Y/N):  y  Visit# / total visits:    Pain level: 0/10       Time In: 3:14   Time Out:  3:45    Progress Note: []  Yes  [x]  No  Next due by: Visit #12, or 22      Subjective:  Patient states things continue to get better at home. Patient is performing exercises at home. Notes pain after she mopped and swept. Objective: YRN performed per flow sheet for increased mobility and strengthening to decrease pain and improve daily living tasks. Patient still challenged by program per flowsheet. Verbal instructions provided throughout. Performed exercises in front of mirror to reduce substitution. Working on endurance of RUE.        Observation:    Test measurements:      Exercises:   Exercise/Equipment Resistance/Repetitions Other comments   Doorway pec stretch 30\"x2    Shld extension stretch 10x  on E stim box   Int rotation stretch 10x5\" With strap   B rowing/ extension 15x green    6-way t-band 15x green    Flex to 90 deg 15x, 2# vc's for substitution   Scaption up/down 15x, 2#    UBE 5'    Prone scap program 15x, 1#    Ext rotate in sidely 15x, 1#    Abd in sidely 15x, 1#    Standing ABCs 1x, 1#    Supine overhead reach 15x    Serratus punches 15x, 1#    Roller on wall 3 directions, 10x    ROM 5'    Cybex 11 30#, 2x10    [x] Provided verbal/tactile cueing for activities related to strengthening, flexibility, endurance, ROM. (12665)  [] Provided verbal/tactile cueing for activities related to improving balance, coordination, kinesthetic sense, posture, motor skill, proprioception. (98261)    Therapeutic Activities:     [] Therapeutic activities, direct (one-on-one) patient contact (use of dynamic activities to improve functional performance). (75725)    Gait:   [] Provided training and instruction to the patient for ambulation re-education. (81983)    Self-Care/ADL's  [] Self-care/home management training and compensatory training, meal preparation, safety procedures, and instructions in use of assistive technology devices/adaptive equipment, direct one-on-one contact. (08350)    Home Exercise Program: Impingement reduction program : pect, ext, int rot stretch    [x] Reviewed/Progressed HEP activities related to strengthening, flexibility, endurance, ROM. (95231)  [] Reviewed/Progressed HEP activities related to improving balance, coordination, kinesthetic sense, posture, motor skill, proprioception.  (78019)    Manual Treatments:    [] Provided manual therapy to mobilize soft tissue/joints for the purpose of modulating pain, promoting relaxation,  increasing ROM, reducing/eliminating soft tissue swelling/inflammation/restriction, improving soft tissue extensibility. (99108)    Service Based Modalities:      Timed Code Treatment Minutes:    There ex 31'    Total Treatment Minutes:  32'     Treatment/Activity Tolerance:  [x] Patient tolerated treatment well [] Patient limited by fatique  [] Patient limited by pain  [] Patient limited by other medical complications  [] Other:     Prognosis: [x] Good [] Fair  [] Poor    Patient Requires Follow-up: [x] Yes  [] No      Goals:  Short term goals  Time Frame for Short term goals: 1 week  Short term goal 1: Start HEP     Long term goals  Time Frame for Long term goals : 4 week  Long term goal 1: R shld pain controlled at 2/10 to allow regular ADL (no pain)  Long term goal 2: AROM 170 deg flex, abd, behind the back to T11, behind the head to T3 for efficient ADL (WNL)  Long term goal 3: 4+/5 strength for proper lifting tolerance (current 4/5)  Long term goal 4: Sleep thru 90% of the night (sleeps through the night, only wakes up occasionally when she rolls on her arm wrong)       Plan:   [x] Continue per plan of care [] Alter current plan (see comments)  [] Plan of care initiated [] Hold pending MD visit [] Discharge    Plan for Next Session:  Continue to progress.      Electronically signed by:  Sebastián Pascual PTA

## 2022-02-22 ENCOUNTER — HOSPITAL ENCOUNTER (OUTPATIENT)
Dept: MAMMOGRAPHY | Age: 67
Discharge: HOME | End: 2022-02-24
Payer: MEDICARE

## 2022-02-22 ENCOUNTER — HOSPITAL ENCOUNTER (OUTPATIENT)
Dept: BONE DENSITY | Age: 67
Discharge: HOME | End: 2022-02-24
Payer: MEDICARE

## 2022-02-22 DIAGNOSIS — Z12.31 ENCOUNTER FOR SCREENING MAMMOGRAM FOR BREAST CANCER: ICD-10-CM

## 2022-02-22 DIAGNOSIS — Z78.0 POST-MENOPAUSAL: ICD-10-CM

## 2022-02-22 PROCEDURE — 77085 DXA BONE DENSITY AXL VRT FX: CPT

## 2022-02-22 PROCEDURE — 77063 BREAST TOMOSYNTHESIS BI: CPT

## 2022-02-22 NOTE — PROGRESS NOTES
19  Mohinder Mathur : 1970 Sex: female  Age: 52 y.o. Chief Complaint   Patient presents with    Nausea       The patient states that they have had abdominal pain for the last 2 months. The pain is described as cramping and is generalized  The patient denies nausea and vomiting. Bowel movements have been diarrhea. No black or bloody stools. The patient denies dysuria, urinary frequency, urgency and or gross hematuria. No flank pain. No fevers or chills. No chest pain or dyspnea. .         Review of Systems   Constitutional: Negative for appetite change, fatigue and unexpected weight change. HENT: Negative for congestion, ear pain, hearing loss, sinus pain, sore throat and trouble swallowing. Eyes: Negative for photophobia, pain, discharge and redness. Respiratory: Negative for cough, chest tightness and shortness of breath. Cardiovascular: Negative for chest pain, palpitations and leg swelling. Gastrointestinal: Positive for abdominal pain, constipation, diarrhea and nausea. Negative for blood in stool and vomiting. Endocrine: Negative. Genitourinary: Negative for dysuria, flank pain, frequency and hematuria. Musculoskeletal: Negative for arthralgias, back pain, joint swelling and myalgias. Skin: Negative. Allergic/Immunologic: Negative. Neurological: Positive for headaches. Negative for dizziness, seizures, syncope, weakness, light-headedness and numbness. Hematological: Negative for adenopathy. Does not bruise/bleed easily. Psychiatric/Behavioral: Negative. Current Outpatient Medications:     propranolol (INDERAL LA) 60 MG extended release capsule, 1 po q am ,1 po q afternoon ,2 po q  hs, Disp: 120 capsule, Rfl: 2    gabapentin (NEURONTIN) 600 MG tablet, Take 1 tablet by mouth 3 times daily for 30 days.  Instructed to take am of procedure, Disp: 90 tablet, Rfl: 2    cyclobenzaprine (FLEXERIL) 10 MG tablet, Take 1 tablet by mouth 3 times daily as I have reviewed and agree to the content of the note written by the PTA.   Electronically signed by Twin Fong PT [id number] Not on file    Transportation needs:     Medical: Not on file     Non-medical: Not on file   Tobacco Use    Smoking status: Current Every Day Smoker     Packs/day: 1.00     Years: 30.00     Pack years: 30.00     Types: Cigarettes    Smokeless tobacco: Never Used   Substance and Sexual Activity    Alcohol use: No    Drug use: No    Sexual activity: Not on file   Lifestyle    Physical activity:     Days per week: Not on file     Minutes per session: Not on file    Stress: Not on file   Relationships    Social connections:     Talks on phone: Not on file     Gets together: Not on file     Attends Bahai service: Not on file     Active member of club or organization: Not on file     Attends meetings of clubs or organizations: Not on file     Relationship status: Not on file    Intimate partner violence:     Fear of current or ex partner: Not on file     Emotionally abused: Not on file     Physically abused: Not on file     Forced sexual activity: Not on file   Other Topics Concern    Not on file   Social History Narrative    Not on file       Vitals:    06/11/19 0914   Resp: 15   Weight: 115 lb (52.2 kg)   Height: 5' 2\" (1.575 m)       Physical Exam   Constitutional: She is oriented to person, place, and time. She appears well-developed and well-nourished. HENT:   Head: Atraumatic. Right Ear: External ear normal.   Left Ear: External ear normal.   Nose: Nose normal.   Mouth/Throat: Oropharynx is clear and moist. No oropharyngeal exudate. Eyes: Pupils are equal, round, and reactive to light. Conjunctivae and EOM are normal.   Neck: Normal range of motion. Neck supple. No tracheal deviation present. No thyromegaly present. Cardiovascular: Normal rate, regular rhythm and intact distal pulses. Exam reveals no gallop and no friction rub. No murmur heard. Pulmonary/Chest: Effort normal and breath sounds normal. No respiratory distress. Abdominal: Soft. Bowel sounds are normal. There is tenderness. epigastrium   Musculoskeletal: Normal range of motion. She exhibits no edema, tenderness or deformity. Lymphadenopathy:     She has no cervical adenopathy. Neurological: She is alert and oriented to person, place, and time. She displays normal reflexes. No sensory deficit. She exhibits normal muscle tone. Coordination normal.   Skin: Skin is warm and dry. Capillary refill takes less than 2 seconds. No rash noted. Psychiatric: She has a normal mood and affect. Assessment and Plan:  Mechelle Herndon was seen today for nausea. Diagnoses and all orders for this visit:    Recurrent major depression in partial remission ([facility] [address]  -     TSH without Reflex; Future    New daily persistent headache  -     propranolol (INDERAL LA) 60 MG extended release capsule; 1 po q am ,1 po q afternoon ,2 po q  hs  -     gabapentin (NEURONTIN) 600 MG tablet; Take 1 tablet by mouth 3 times daily for 30 days. Instructed to take am of procedure  -     cyclobenzaprine (FLEXERIL) 10 MG tablet; Take 1 tablet by mouth 3 times daily as needed for Muscle spasms Instructed to take am of procedure  -     Natalie Haider MD, Neurology, [place]    Irritable bowel syndrome with diarrhea  -     External Referral To Gastroenterology  -     CBC Auto Differential; Future  -     Comprehensive Metabolic Panel, Fasting; Future  -     Lipid Panel; Future  -     TSH without Reflex; Future  -     Urinalysis; Future        Return in about 6 weeks (around 7/23/2019).       Seen By:  Alina Garrison DO

## 2022-02-23 ENCOUNTER — TELEPHONE (OUTPATIENT)
Dept: FAMILY MEDICINE CLINIC | Age: 67
End: 2022-02-23

## 2022-02-23 ENCOUNTER — HOSPITAL ENCOUNTER (OUTPATIENT)
Dept: PHYSICAL THERAPY | Age: 67
Setting detail: THERAPIES SERIES
Discharge: HOME | End: 2022-02-23
Payer: MEDICARE

## 2022-02-23 PROCEDURE — 97110 THERAPEUTIC EXERCISES: CPT

## 2022-02-23 NOTE — FLOWSHEET NOTE
Physical Therapy Daily Treatment Note    Date:  2022    Patient Name:  Solo Muro    :  1955  MRN: [id number]  Restrictions/Precautions:     Medical/Treatment Diagnosis Information:   · Diagnosis: M77.8 R shld tendinitis  · Treatment Diagnosis: M77.8 L shld tendinitis  Insurance/Certification information:  PT Insurance Information: [facility] Medicare  Physician Information:  Referring Practitioner: Aden Mojica CNP  Plan of care signed (Y/N):  y  Visit# / total visits:    Pain level: 7.5/10       Time In: 4:02  Time Out: 4:31    Progress Note: []  Yes  [x]  No  Next due by: Visit #12, or 22      Subjective:  Patient rates pain high this date due to doing a lot of cooking and chopping for her big visit to see the grand kids. Notes it has been hard to do a lot of things with her RUE today. Objective: YRN performed per flow sheet for increased mobility and strengthening to decrease pain and improve daily living tasks. Reduced some exercises due to patient having increased pain this date. Towards the end of session patient notes her pain has reduced. Instructed patient to continue with exercises at home when pain begins to increase.        Observation:    Test measurements: RUE ROM all WNL, lacking strength/endurance  Exercises:   Exercise/Equipment Resistance/Repetitions Other comments   Doorway pec stretch     Shld extension stretch 10x  on E stim box   Int rotation stretch 10x5\" With strap   B rowing/ extension 15x green    6-way t-band 15x green    Flex to 90 deg 15x, 2# vc's for substitution   Scaption up/down 15x, 2#    UBE 5'    Prone scap program 15x,     Ext rotate in sidely 15x,     Abd in sidely 15x,     Standing ABCs 1x,     Supine overhead reach 15x    Serratus punches 15x, 1#    Roller on wall 3 directions, 10x    ROM 5'    Cybex 11     [x] Provided verbal/tactile cueing for activities related to strengthening, flexibility, endurance, ROM. (89368)  [] Provided verbal/tactile cueing for activities related to improving balance, coordination, kinesthetic sense, posture, motor skill, proprioception. (92454)    Therapeutic Activities:     [] Therapeutic activities, direct (one-on-one) patient contact (use of dynamic activities to improve functional performance). (00191)    Gait:   [] Provided training and instruction to the patient for ambulation re-education. (58774)    Self-Care/ADL's  [] Self-care/home management training and compensatory training, meal preparation, safety procedures, and instructions in use of assistive technology devices/adaptive equipment, direct one-on-one contact. (97215)    Home Exercise Program: Impingement reduction program : pect, ext, int rot stretch    [x] Reviewed/Progressed HEP activities related to strengthening, flexibility, endurance, ROM. (25908)  [] Reviewed/Progressed HEP activities related to improving balance, coordination, kinesthetic sense, posture, motor skill, proprioception.  (88836)    Manual Treatments:    [] Provided manual therapy to mobilize soft tissue/joints for the purpose of modulating pain, promoting relaxation,  increasing ROM, reducing/eliminating soft tissue swelling/inflammation/restriction, improving soft tissue extensibility. (23929)    Service Based Modalities:      Timed Code Treatment Minutes:    There ex 29'    Total Treatment Minutes:  29'     Treatment/Activity Tolerance:  [x] Patient tolerated treatment well [] Patient limited by fatique  [] Patient limited by pain  [] Patient limited by other medical complications  [] Other:     Prognosis: [x] Good [] Fair  [] Poor    Patient Requires Follow-up: [x] Yes  [] No      Goals:  Short term goals  Time Frame for Short term goals: 1 week  Short term goal 1: Start HEP     Long term goals  Time Frame for Long term goals : 4 week  Long term goal 1: R shld pain controlled at 2/10 to allow regular ADL (no pain)  Long term goal 2: AROM 170 deg flex, abd, behind the back to T11, behind the head to T3 for efficient ADL (WNL)  Long term goal 3: 4+/5 strength for proper lifting tolerance (current 4/5)  Long term goal 4: Sleep thru 90% of the night (sleeps through the night, only wakes up occasionally when she rolls on her arm wrong)       Plan:   [x] Continue per plan of care [] Alter current plan (see comments)  [] Plan of care initiated [] Hold pending MD visit [] Discharge    Plan for Next Session:  Continue to progress.      Electronically signed by:  Carolyn Rodriguez PTA

## 2022-02-23 NOTE — TELEPHONE ENCOUNTER
----- Message from ALFONZO Magana CNP sent at 2/22/2022  4:14 PM EST -----  Osteopenia on exam. Pt is to take calcium and vitamin D daily.  Weight bearing exercises

## 2022-02-24 NOTE — PROGRESS NOTES
I have reviewed and agree to the content of the note written by the PTA.   Electronically signed by Malachi Menezes PT [id number]

## 2022-02-28 ENCOUNTER — HOSPITAL ENCOUNTER (OUTPATIENT)
Dept: PHYSICAL THERAPY | Age: 67
Setting detail: THERAPIES SERIES
Discharge: HOME | End: 2022-02-28
Payer: MEDICARE

## 2022-02-28 NOTE — PROGRESS NOTES
Physical Therapy  Outpatient Physical Therapy    [x] [place]  Phone: [phone]  Fax: [phone]      [] [place]  Phone: [phone]  Fax: [phone]    Physical Therapy  Cancellation/No-show Note  Patient Name:  Marshal Carbajal  :  1955   Date:  2022  Cancelled visits to date: 1  No-shows to date: 1     For today's appointment patient:  [x]  Cancelled  []  Rescheduled appointment  []  No-show     Reason given by patient:  []  Patient ill  []  Conflicting appointment  []  No transportation    []  Conflict with work  []  No reason given  [x]  Other:   emergency   Comments:      Electronically signed by: Carol Mcclain PTA

## 2022-03-03 ENCOUNTER — HOSPITAL ENCOUNTER (OUTPATIENT)
Dept: PHYSICAL THERAPY | Age: 67
Setting detail: THERAPIES SERIES
Discharge: HOME | End: 2022-03-03
Payer: MEDICARE

## 2022-03-03 PROCEDURE — 97110 THERAPEUTIC EXERCISES: CPT

## 2022-03-03 NOTE — FLOWSHEET NOTE
Physical Therapy Daily Treatment Note    Date:  3/3/2022    Patient Name:  Candace Daily    :  1955  MRN: [id number]  Restrictions/Precautions:     Medical/Treatment Diagnosis Information:   · Diagnosis: M77.8 R shld tendinitis  · Treatment Diagnosis: M77.8 L shld tendinitis  Insurance/Certification information:  PT Insurance Information: [facility] Medicare  Physician Information:  Referring Practitioner: Perlita Vinson CNP  Plan of care signed (Y/N):  y  Visit# / total visits:    Pain level: 0/10       Time In: 4:15   Time Out:  4:47    Progress Note: []  Yes  [x]  No  Next due by: Visit #12, or 22      Subjective:  Pt states shoulder was very painful last night and kept her up. Pt described pain as a consistent achiness. Pt states will complete therapy a little longer however if no change will return to doctor. Pt states felt shoulder was improving however now has been aching more and mostly at night. Objective: YRN performed per flow sheet for increased mobility and strengthening to decrease pain and improve daily living tasks. Patient still challenged by program per flowsheet. Verbal instructions provided throughout. Performed exercises in front of mirror to reduce substitution. Working on endurance of RUE. Advanced reps and weights this date.       Observation:    Test measurements:  R shoulder flexion: 4/5    Exercises:   Exercise/Equipment Resistance/Repetitions Other comments   Doorway pec stretch 30\"x2    Shld extension stretch 10x  on E stim box   Int rotation stretch 10x5\" With strap   B rowing/ extension 20x green    6-way t-band 15x green    Flex to 90 deg 2x10, 2# vc's for substitution   Scaption up/down 15x, 2#    UBE 5'    Prone scap program 15x, 1#    Ext rotate in sidely 15x, 1#    Abd in sidely 15x, 1#    Standing ABCs 1x, 2#    Supine overhead reach     Serratus punches 10x, 2#    Roller on wall 3 directions, 10x    ROM 5'    Cybex 11 30#, 2x10    [x] Provided verbal/tactile cueing for activities related to strengthening, flexibility, endurance, ROM. (23374)  [] Provided verbal/tactile cueing for activities related to improving balance, coordination, kinesthetic sense, posture, motor skill, proprioception. (40751)    Therapeutic Activities:     [] Therapeutic activities, direct (one-on-one) patient contact (use of dynamic activities to improve functional performance). (79714)    Gait:   [] Provided training and instruction to the patient for ambulation re-education. (43092)    Self-Care/ADL's  [] Self-care/home management training and compensatory training, meal preparation, safety procedures, and instructions in use of assistive technology devices/adaptive equipment, direct one-on-one contact. (31326)    Home Exercise Program: Impingement reduction program : pect, ext, int rot stretch    [x] Reviewed/Progressed HEP activities related to strengthening, flexibility, endurance, ROM. (49199)  [] Reviewed/Progressed HEP activities related to improving balance, coordination, kinesthetic sense, posture, motor skill, proprioception.  (67216)    Manual Treatments:    [] Provided manual therapy to mobilize soft tissue/joints for the purpose of modulating pain, promoting relaxation,  increasing ROM, reducing/eliminating soft tissue swelling/inflammation/restriction, improving soft tissue extensibility. (59408)    Service Based Modalities:      Timed Code Treatment Minutes:    There ex 32'    Total Treatment Minutes:  28'     Treatment/Activity Tolerance:  [x] Patient tolerated treatment well [] Patient limited by fatique  [] Patient limited by pain  [] Patient limited by other medical complications  [] Other:     Prognosis: [x] Good [] Fair  [] Poor    Patient Requires Follow-up: [x] Yes  [] No      Goals:  Short term goals  Time Frame for Short term goals: 1 week  Short term goal 1: Start HEP     Long term goals  Time Frame for Long term goals : 4 week  Long term goal 1: R shld

## 2022-03-04 NOTE — PROGRESS NOTES
I have reviewed and agree to the content of the note written by the PTA.   Electronically signed by Heriberto Bull PT [id number]

## 2022-03-08 ENCOUNTER — HOSPITAL ENCOUNTER (OUTPATIENT)
Dept: PHYSICAL THERAPY | Age: 67
Setting detail: THERAPIES SERIES
Discharge: HOME | End: 2022-03-08
Payer: MEDICARE

## 2022-03-08 PROCEDURE — 97110 THERAPEUTIC EXERCISES: CPT

## 2022-03-08 NOTE — FLOWSHEET NOTE
Physical Therapy Daily Treatment Note    Date:  3/8/2022    Patient Name:  Marley Post    :  1955  MRN: [id number]  Restrictions/Precautions:     Medical/Treatment Diagnosis Information:   · Diagnosis: M77.8 R shld tendinitis  · Treatment Diagnosis: M77.8 L shld tendinitis  Insurance/Certification information:  PT Insurance Information: [facility] Medicare  Physician Information:  Referring Practitioner: Anna Avalos CNP  Plan of care signed (Y/N):  y  Visit# / total visits:  10/12  Pain level: 0/10       Time In: 4:20   Time Out: 4:52    Progress Note: []  Yes  [x]  No  Next due by: Visit #12, or 22      Subjective:  Pt states shoulder was very painful last week and kept her up. It is feeling a lot better today. Patient states she thinks some of the pain is because of the added weight with exercises. Objective: YRN performed per flow sheet for increased mobility and strengthening to decrease pain and improve daily living tasks. Reduced weight with exercises this date due to causing increased pain last week. Will re-add as see fit, and reduce reps. Challenged by [employer] and endurance exercises using RUE. Unable to finish reps of overhead reach due to pain. Pain relieved after exercise was held.        Observation:    Test measurements:  R shoulder flexion: 4/5    Exercises:    Exercise/Equipment Resistance/Repetitions Other comments   Doorway pec stretch 30\"x2    Shld extension stretch 10x  on E stim box   Int rotation stretch 10x5\" With strap   B rowing/ extension 20x green    6-way t-band 15x green    Flex to 90 deg 15x, 1# vc's for substitution   Scaption up/down 15x, 1#    UBE 5'    Prone scap program 15x, 1#    Ext rotate in sidely 15x, 1#    Abd in sidely 15x, 1#    Standing ABCs 1x, 1#    Supine overhead reach 15x    Serratus punches 10x, 1#    Roller on wall 3 directions, 10x    ROM 5'    Cybex 11     [x] Provided verbal/tactile cueing for activities related to strengthening, flexibility, endurance, ROM. (45663)  [] Provided verbal/tactile cueing for activities related to improving balance, coordination, kinesthetic sense, posture, motor skill, proprioception. (87475)    Therapeutic Activities:     [] Therapeutic activities, direct (one-on-one) patient contact (use of dynamic activities to improve functional performance). (63458)    Gait:   [] Provided training and instruction to the patient for ambulation re-education. (42978)    Self-Care/ADL's  [] Self-care/home management training and compensatory training, meal preparation, safety procedures, and instructions in use of assistive technology devices/adaptive equipment, direct one-on-one contact. (37884)    Home Exercise Program: Impingement reduction program : pect, ext, int rot stretch    [x] Reviewed/Progressed HEP activities related to strengthening, flexibility, endurance, ROM. (75574)  [] Reviewed/Progressed HEP activities related to improving balance, coordination, kinesthetic sense, posture, motor skill, proprioception.  (59621)    Manual Treatments:    [] Provided manual therapy to mobilize soft tissue/joints for the purpose of modulating pain, promoting relaxation,  increasing ROM, reducing/eliminating soft tissue swelling/inflammation/restriction, improving soft tissue extensibility. (96847)    Service Based Modalities:      Timed Code Treatment Minutes:    There ex 32'    Total Treatment Minutes:  28'     Treatment/Activity Tolerance:  [x] Patient tolerated treatment well [] Patient limited by fatique  [] Patient limited by pain  [] Patient limited by other medical complications  [] Other:     Prognosis: [x] Good [] Fair  [] Poor    Patient Requires Follow-up: [x] Yes  [] No      Goals:  Short term goals  Time Frame for Short term goals: 1 week  Short term goal 1: Start HEP     Long term goals  Time Frame for Long term goals : 4 week  Long term goal 1: R shld pain controlled at 2/10 to allow regular ADL (no pain)  Long term goal 2: AROM 170 deg flex, abd, behind the back to T11, behind the head to T3 for efficient ADL (WNL)  Long term goal 3: 4+/5 strength for proper lifting tolerance (see above)  Long term goal 4: Sleep thru 90% of the night ( is able, had a bad week last week with pain). Plan:   [x] Continue per plan of care [] Alter current plan (see comments)  [] Plan of care initiated [] Hold pending MD visit [] Discharge    Plan for Next Session:  Continue to progress.      Electronically signed by:  Sierra Mckee PTA

## 2022-03-09 NOTE — PROGRESS NOTES
I have reviewed and agree to the content of the note written by the PTA.   Electronically signed by Eva Taylor PT [id number]

## 2022-03-11 ENCOUNTER — HOSPITAL ENCOUNTER (OUTPATIENT)
Dept: PHYSICAL THERAPY | Age: 67
Setting detail: THERAPIES SERIES
Discharge: HOME | End: 2022-03-11
Payer: MEDICARE

## 2022-03-11 PROCEDURE — 97110 THERAPEUTIC EXERCISES: CPT | Performed by: PHYSICAL THERAPIST

## 2022-03-11 NOTE — FLOWSHEET NOTE
Physical Therapy Daily Treatment Note    Date:  3/11/2022    Patient Name:  Juventino Chacon    :  1955  MRN: [id number]  Restrictions/Precautions:     Medical/Treatment Diagnosis Information:   · Diagnosis: M77.8 R shld tendinitis  · Treatment Diagnosis: M77.8 L shld tendinitis  Insurance/Certification information:  PT Insurance Information: [facility] Medicare  Physician Information:  Referring Practitioner: Priyakna Woodward CNP  Plan of care signed (Y/N):  y  Visit# / total visits:    Pain level: 0/10       Time In: 2:58   Time Out: 3:28    Progress Note: []  Yes  [x]  No  Next due by: Visit #12, or 22      Subjective: Is doing ok at home. Is better than 6 weeks ago. Objective:    Observation:    Test measurements:    R shoulder flexion: 4/5, abd 4/5, ext rot 4/5, int rot 5/5.  Mild weakness, min pain                       Scaption Up/ Down 4/5, no pain  AROM WNL, no pain  Negative impingement signs  Will d/c from active program, and do HEP     Exercises:    Exercise/Equipment Resistance/Repetitions Other comments   Doorway pec stretch     Shld extension stretch   on E stim box   Int rotation stretch  With strap   B rowing/ extension 20x blue    6-way t-band 15x green    Flex to 90 deg 15x, 1# vc's for substitution   Scaption up/down 15x, 1#    UBE     Prone scap program 15x, 1#    Ext rotate in sidely 15x, 1#    Abd in sidely 15x, 1#    Standing ABCs 1x, 1#    Supine overhead reach 15x    Serratus punches 10x, 1#    Roller on wall 3 directions, 10x    ROM 5'    Cybex 11     [x] Provided verbal/tactile cueing for activities related to strengthening, flexibility, endurance, ROM. (08291)  [] Provided verbal/tactile cueing for activities related to improving balance, coordination, kinesthetic sense, posture, motor skill, proprioception. (79934)    Therapeutic Activities:     [] Therapeutic activities, direct (one-on-one) patient contact (use of dynamic activities to improve functional performance). (45122)    Gait:   [] Provided training and instruction to the patient for ambulation re-education. (87834)    Self-Care/ADL's  [] Self-care/home management training and compensatory training, meal preparation, safety procedures, and instructions in use of assistive technology devices/adaptive equipment, direct one-on-one contact. (81748)    Home Exercise Program: 6-way t-band   [x] Reviewed/Progressed HEP activities related to strengthening, flexibility, endurance, ROM. (48874)  [] Reviewed/Progressed HEP activities related to improving balance, coordination, kinesthetic sense, posture, motor skill, proprioception.  (29589)    Manual Treatments:    [] Provided manual therapy to mobilize soft tissue/joints for the purpose of modulating pain, promoting relaxation,  increasing ROM, reducing/eliminating soft tissue swelling/inflammation/restriction, improving soft tissue extensibility. (71647)    Service Based Modalities:      Timed Code Treatment Minutes:    There ex 30'    Total Treatment Minutes:  27'     Treatment/Activity Tolerance:  [x] Patient tolerated treatment well [] Patient limited by fatique  [] Patient limited by pain  [] Patient limited by other medical complications  [] Other:     Prognosis: [x] Good [] Fair  [] Poor    Patient Requires Follow-up: [x] Yes  [] No      Goals:  Short term goals  Time Frame for Short term goals: 1 week  Short term goal 1: Start HEP- met     Long term goals  Time Frame for Long term goals : 4 week  Long term goal 1: R shld pain controlled at 2/10 to allow regular ADL- met   Long term goal 2: AROM 170 deg flex, abd, behind the back to T11, behind the head to T3 for efficient ADL - met  Long term goal 3: 4+/5 strength for proper lifting tolerance -part met  Long term goal 4: Sleep thru 90% of the night - met       Plan:   [] Continue per plan of care [] Alter current plan (see comments)  [] Plan of care initiated [] Hold pending MD visit [x] Discharge    Plan for Next Session:  Continue to progress.      Electronically signed by:  Rajiv Taylor PT

## 2022-03-11 NOTE — DISCHARGE SUMMARY
[facility]    [x] [place]  Phone: [phone]  Fax: [phone]      [] [place]  Phone: [phone]  Fax: [phone]    Physical Therapy Discharge Note  Date: 3/11/2022        Patient Name:  Gurmeet Nugent    :  1955  MRN: [id number]  Restrictions/Precautions:      Medical/Treatment Diagnosis Information:  ·   Diagnosis: M77.8 R shld tendinitis  · Treatment Diagnosis: M77.8 L shld tendinitis  ·   ·    Insurance/Certification information:     [facility] Medicare  Physician Information:     Winnie Colindres of care signed (Y/N): y  Visit# / total visits:  11  Pain level: 0/10       Time Period for Report:    Cancels/No-shows to date:      Plan of Care/Treatment to date:  [x] Therapeutic Exercise    [] Modalities:  [] Therapeutic Activity     [] Ultrasound  [] Electrical Stimulation  [] Gait Training      [] Cervical Traction    [] Lumbar Traction  [] Neuromuscular Re-education  [] Cold/hotpack [] Iontophoresis  [x] Instruction in HEP      Other:  [x] Manual Therapy       []    [] Aquatic Therapy       []                              Subjective:    Is doing ok at home. Is better than 6 weeks ago. Objective:    Test measurements:    R shoulder flexion: 4/5, abd 4/5, ext rot 4/5, int rot 5/5.  Mild weakness, min pain                       Scaption Up/ Down 4/5, no pain  AROM WNL, no pain  Negative impingement signs  Will d/c from active program, and do HEP             Plan:    d/c       Goals:    Short term goals  Time Frame for Short term goals: 1 week  Short term goal 1: Start HEP- met      Long term goals  Time Frame for Long term goals : 4 week  Long term goal 1: R shld pain controlled at 2/10 to allow regular ADL- met   Long term goal 2: AROM 170 deg flex, abd, behind the back to T11, behind the head to T3 for efficient ADL - met  Long term goal 3: 4+/5 strength for proper lifting tolerance -part met  Long term goal 4: Sleep thru 90% of the night - met         Percentage of Goals Met: 90            Discharge Prognosis: [] Excellent [x] Good [] Fair  [] Poor     Goal Status:  [x] Achieved [] Partially Achieved  [] Not Achieved       Electronically signed by:  Johnathon Gottron, PT

## 2022-03-14 ENCOUNTER — APPOINTMENT (OUTPATIENT)
Dept: PHYSICAL THERAPY | Age: 67
End: 2022-03-14
Payer: MEDICARE

## 2022-03-16 ENCOUNTER — APPOINTMENT (OUTPATIENT)
Dept: PHYSICAL THERAPY | Age: 67
End: 2022-03-16
Payer: MEDICARE

## 2022-04-18 ENCOUNTER — OFFICE VISIT (OUTPATIENT)
Dept: FAMILY MEDICINE CLINIC | Age: 67
End: 2022-04-18
Payer: MEDICARE

## 2022-04-18 VITALS
HEIGHT: 57 IN | HEART RATE: 74 BPM | RESPIRATION RATE: 18 BRPM | SYSTOLIC BLOOD PRESSURE: 104 MMHG | DIASTOLIC BLOOD PRESSURE: 68 MMHG | OXYGEN SATURATION: 99 % | BODY MASS INDEX: 21.27 KG/M2 | WEIGHT: 98.6 LBS

## 2022-04-18 DIAGNOSIS — Z00.00 INITIAL MEDICARE ANNUAL WELLNESS VISIT: Primary | ICD-10-CM

## 2022-04-18 DIAGNOSIS — F17.210 SMOKES WITH GREATER THAN 40 PACK YEAR HISTORY: ICD-10-CM

## 2022-04-18 DIAGNOSIS — Z00.00 MEDICARE ANNUAL WELLNESS VISIT, INITIAL: ICD-10-CM

## 2022-04-18 PROCEDURE — 4040F PNEUMOC VAC/ADMIN/RCVD: CPT | Performed by: NURSE PRACTITIONER

## 2022-04-18 PROCEDURE — 99397 PER PM REEVAL EST PAT 65+ YR: CPT

## 2022-04-18 PROCEDURE — 1123F ACP DISCUSS/DSCN MKR DOCD: CPT | Performed by: NURSE PRACTITIONER

## 2022-04-18 PROCEDURE — G0463 HOSPITAL OUTPT CLINIC VISIT: HCPCS

## 2022-04-18 PROCEDURE — 3017F COLORECTAL CA SCREEN DOC REV: CPT | Performed by: NURSE PRACTITIONER

## 2022-04-18 PROCEDURE — G0438 PPPS, INITIAL VISIT: HCPCS | Performed by: NURSE PRACTITIONER

## 2022-04-18 RX ORDER — HYDROXYZINE PAMOATE 25 MG/1
CAPSULE ORAL
COMMUNITY
Start: 2022-03-10

## 2022-04-18 RX ORDER — DULOXETIN HYDROCHLORIDE 20 MG/1
CAPSULE, DELAYED RELEASE ORAL
COMMUNITY
Start: 2022-03-10

## 2022-04-18 ASSESSMENT — PATIENT HEALTH QUESTIONNAIRE - PHQ9
SUM OF ALL RESPONSES TO PHQ QUESTIONS 1-9: 0
SUM OF ALL RESPONSES TO PHQ QUESTIONS 1-9: 0
SUM OF ALL RESPONSES TO PHQ9 QUESTIONS 1 & 2: 0
1. LITTLE INTEREST OR PLEASURE IN DOING THINGS: 0
2. FEELING DOWN, DEPRESSED OR HOPELESS: 0
SUM OF ALL RESPONSES TO PHQ QUESTIONS 1-9: 0
SUM OF ALL RESPONSES TO PHQ QUESTIONS 1-9: 0

## 2022-04-18 ASSESSMENT — LIFESTYLE VARIABLES
HOW MANY STANDARD DRINKS CONTAINING ALCOHOL DO YOU HAVE ON A TYPICAL DAY: 1 OR 2
HOW OFTEN DO YOU HAVE A DRINK CONTAINING ALCOHOL: 2-4 TIMES A MONTH

## 2022-04-18 NOTE — PATIENT INSTRUCTIONS
Learning About Benefits From Quitting Smoking  How does quitting smoking make you healthier? If you're thinking about quitting smoking, you may have a few reasons to besmoke-free. Your health may be one of them.  When you quit smoking, you lower your risks for cancer, lung disease, heart attack, stroke, blood vessel disease, and blindness from macular degeneration.  When you're smoke-free, you get sick less often, and you heal faster. You are less likely to get colds, flu, bronchitis, and pneumonia.  As a nonsmoker, you may find that your mood is better and you are less stressed. When and how will you feel healthier? Quitting has real health benefits that start from day 1 of being smoke-free. And the longer you stay smoke-free, the healthier you get and the better youfeel. The first hours   After just 20 minutes, your blood pressure and heart rate go down. That means there's less stress on your heart and blood vessels.  Within 12 hours, the level of carbon monoxide in your blood drops back to normal. That makes room for more oxygen. With more oxygen in your body, you may notice that you have more energy than when you smoked. After 2 weeks   Your lungs start to work better.  Your risk of heart attack starts to drop. After 1 month   When your lungs are clear, you cough less and breathe deeper, so it's easier to be active.  Your sense of taste and smell return. That means you can enjoy food more than you have since you started smoking. Over the years   Over the years, your risks of heart disease, heart attack, and stroke are lower.  After 10 years, your risk of dying from lung cancer is cut by about half. And your risk for many other types of cancer is lower too. How would quitting help others in your life? When you quit smoking, you improve the health of everyone who now breathes inyour smoke.  Their heart, lung, and cancer risks drop, much like yours.  They are sick less.  For babies and small children, living smoke-free means they're less likely to have ear infections, pneumonia, and bronchitis.  If you're a woman who is or will be pregnant someday, quitting smoking means a healthier .  Children who are close to you are less likely to become adult smokers. Where can you learn more? Go to https://chpepiceweb.healthCogniSens. org and sign in to your [employer] account. Enter [phone] in the [facility] box to learn more about \"Learning About Benefits From Quitting Smoking. \"     If you do not have an account, please click on the \"Sign Up Now\" link. Current as of: 2021               Content Version: 13.2   Healthwise, [employer]. Care instructions adapted under license by [facility]. If you have questions about a medical condition or this instruction, always ask your healthcare professional. [place] [address] any warranty or liability for your use of this information. Personalized Preventive Plan for Kristine Turk - 2022  Medicare offers a range of preventive health benefits. Some of the tests and screenings are paid in full while other may be subject to a deductible, co-insurance, and/or copay. Some of these benefits include a comprehensive review of your medical history including lifestyle, illnesses that may run in your family, and various assessments and screenings as appropriate. After reviewing your medical record and screening and assessments performed today your provider may have ordered immunizations, labs, imaging, and/or referrals for you. A list of these orders (if applicable) as well as your Preventive Care list are included within your After Visit Summary for your review. Other Preventive Recommendations:    · A preventive eye exam performed by an eye specialist is recommended every 1-2 years to screen for glaucoma; cataracts, macular degeneration, and other eye disorders.   · A preventive dental visit is recommended every 6 months. · Try to get at least 150 minutes of exercise per week or 10,000 steps per day on a pedometer . · Order or download the FREE \"Exercise & Physical Activity: Your Everyday Guide\" from [employer]. Call 2-548.471.6174 or search [employer] online. · You need 4289-5278 mg of calcium and 9105-8205 IU of vitamin D per day. It is possible to meet your calcium requirement with diet alone, but a vitamin D supplement is usually necessary to meet this goal.  · When exposed to the sun, use a sunscreen that protects against both UVA and UVB radiation with an SPF of 30 or greater. Reapply every 2 to 3 hours or after sweating, drying off with a towel, or swimming. · Always wear a seat belt when traveling in a car. Always wear a helmet when riding a bicycle or motorcycle.

## 2022-04-18 NOTE — PROGRESS NOTES
Subjective:      Patient ID: Rl Pimentel is a 77 y.o. female coming in for   Chief Complaint   Patient presents with    3 Month Follow-Up     LSS patient. 3 month follow up.  Medicare AW      Assessment:      1. Initial Medicare annual wellness visit    2. Medicare annual wellness visit, initial    3. Smokes with greater than 40 pack year history           Plan:      Orders Placed This Encounter   Procedures    CT lung screen [Initial/Annual]     Age: 77 y.o. Smoking History:   Social History    Tobacco Use      Smoking status: Current Some Day Smoker        Types: Cigarettes      Smokeless tobacco: Never Used      Tobacco comment: 1-5 daily     Alcohol use: Not on file    Drug use: Not on file    Pack years: 0  Last CT lung screen: No previous lung cancer screening exam     Standing Status:   Future     Standing Expiration Date:   4/18/2023     Order Specific Question:   Is there documentation of shared decision making? Answer:   Yes     Order Specific Question:   Does the patient show any signs or symptoms of lung cancer? Answer:   No     Order Specific Question:   Is this the first (baseline) CT or an annual exam?     Answer:   Baseline [1]     Order Specific Question:   Is this a low dose CT or a routine CT? Answer:   Low Dose CT [1]     Order Specific Question:   Smoking Status? Answer:   Current Some Day Smoker [2]     Order Specific Question:   Smoking packs per day? Answer:   0.5     Order Specific Question:   Years smoking? Answer:   50      Outpatient Encounter Medications as of 4/18/2022   Medication Sig Dispense Refill    DULoxetine (CYMBALTA) 20 MG extended release capsule take 1 capsule by mouth once daily IF DAYTIME SEDATION TAKE AT BEDTIME      hydrOXYzine (VISTARIL) 25 MG capsule take 1 capsule by mouth three times a day if needed anxiety      ibuprofen (ADVIL;MOTRIN) 800 MG tablet Take 1 tablet by mouth every 8 hours as needed for Pain (with food).  (Patient not taking: Reported on 4/18/2022) 90 tablet 0     No facility-administered encounter medications on file as of 4/18/2022. ALFONZO Atwood - CNP Tobacco Cessation Counseling: Patient advised about behavior change, including information about personal health harms, usage of appropriate cessation measures and benefits of cessation. Time spent (minutes): 5  Medicare Annual Wellness Visit    Sylvia Jorge is here for 3 Month Follow-Up (S patient. 3 month follow up.) and Medicare AWV    Assessment & Plan   Initial Medicare annual wellness visit  Medicare annual wellness visit, initial  Smokes with greater than 40 pack year history  -     CT lung screen [Initial/Annual]; Future      Recommendations for Preventive Services Due: see orders and patient instructions/AVS.  Recommended screening schedule for the next 5-10 years is provided to the patient in written form: see Patient Instructions/AVS.     Return for Medicare Annual Wellness Visit in 1 year. Subjective   The following acute and/or chronic problems were also addressed today:  None, doing well. Following with psychiatry for anxiety. Right shoulder pain improved with PT    Patient's complete Health Risk Assessment and screening values have been reviewed and are found in Flowsheets. The following problems were reviewed today and where indicated follow up appointments were made and/or referrals ordered.     Positive Risk Factor Screenings with Interventions:         Tobacco Use:     Tobacco Use: High Risk    Smoking Tobacco Use: Current Some Day Smoker    Smokeless Tobacco Use: Never Used     E-Cigarettes/Vaping Use     Questions Responses    E-Cigarette/Vaping Use     Start Date     Passive Exposure     Quit Date     Counseling Given     Comments         Substance Abuse - Tobacco Interventions:  tobacco cessation tips and resources provided, patient is not ready to work toward tobacco cessation at this time           [employer] and ACP:  General  In general, how would you say your health is?: Very Good  In the past 7 days, have you experienced any of the following: New or Increased Pain, New or Increased Fatigue, Loneliness, Social Isolation, Stress or Anger?: No  Do you get the social and emotional support that you need?: Yes  Do you have a Living Will?: (!) No    Advance Directives     Power of  Living Will ACP-Advance Directive ACP-Power of     Not on File Not on File Not on File Not on File      General Health Risk Interventions:  · No Living Will: Advance Care Planning addressed with patient today and Patient declines ACP discussion/assistance    Health Habits/Nutrition:     Physical Activity: Insufficiently Active    Days of Exercise per Week: 2 days    Minutes of Exercise per Session: 10 min     Have you lost any weight without trying in the past 3 months?: (!) Yes  Body mass index: 21.33  Have you seen the dentist within the past year?: (!) No    Health Habits/Nutrition Interventions:  · Inadequate physical activity:  patient is not ready to increase his/her physical activity level at this time  · Nutritional issues:  patient is not ready to address his/her nutritional/weight issues at this time  · Dental exam overdue:  patient declines dental evaluation             Objective   Vitals:    04/18/22 1631   BP: 104/68   Pulse: 74   Resp: 18   SpO2: 99%   Weight: 98 lb 9.6 oz (44.7 kg)   Height: 4' 9\" (1.448 m)      Body mass index is 21.34 kg/m².         General Appearance: alert and oriented to person, place and time, well-developed and well-nourished, in no acute distress  Pulmonary/Chest: clear to auscultation bilaterally- no wheezes, rales or rhonchi, normal air movement, no respiratory distress  Cardiovascular: normal rate, normal S1 and S2, no gallops, intact distal pulses and no carotid bruits  Extremities: no cyanosis and no clubbing  Neurologic: gait and coordination normal and speech normal       No Known Allergies  Prior to Visit Medications    Medication Sig Taking? Authorizing Provider   DULoxetine (CYMBALTA) 20 MG extended release capsule take 1 capsule by mouth once daily IF DAYTIME SEDATION TAKE AT BEDTIME Yes Historical Provider, MD   hydrOXYzine (VISTARIL) 25 MG capsule take 1 capsule by mouth three times a day if needed anxiety Yes Historical Provider, MD   ibuprofen (ADVIL;MOTRIN) 800 MG tablet Take 1 tablet by mouth every 8 hours as needed for Pain (with food).   Patient not taking: Reported on 4/18/2022  ALFONZO Moeller CNP       CareTeam (Including outside providers/suppliers regularly involved in providing care):   Patient Care Team:  ALFONZO Moeller CNP as PCP - General (Family Medicine)  ALFONZO Moeller CNP as PCP - [facility] Empaneled Provider    Reviewed and updated this visit:  Tobacco  Allergies  Meds  Problems  Med Hx  Surg Hx  Soc Hx  Fam Hx

## 2022-06-10 ENCOUNTER — TELEPHONE (OUTPATIENT)
Dept: ONCOLOGY | Age: 67
End: 2022-06-10

## 2022-06-10 NOTE — LETTER
6/10/2022        Nas Linn  [address] [place]    Dear Nas Linn: Your healthcare provider has ordered a low dose CT scan of the chest for lung cancer screening. You will find enclosed, information about CT lung screening. Please review the statement of understanding, you will be asked to sign a copy of this at the time of your CT scan    If you have not already been contacted to make the appointment for your scan, please call our scheduling department at [phone]    Keep in mind that CT lung screening does not take the place of smoking cessation. If you are a current smoker, you will find enclosed smoking cessation resources. Please do not hesitate to contact me if you have any questions or concerns.     [address] [place],      [facility] Screening [facility]  [phone]

## 2022-07-01 ENCOUNTER — HOSPITAL ENCOUNTER (OUTPATIENT)
Dept: CT IMAGING | Age: 67
Discharge: HOME | End: 2022-07-03
Payer: MEDICARE

## 2022-07-01 DIAGNOSIS — F17.210 SMOKES WITH GREATER THAN 40 PACK YEAR HISTORY: ICD-10-CM

## 2022-07-01 PROCEDURE — 71271 CT THORAX LUNG CANCER SCR C-: CPT

## 2022-07-06 ENCOUNTER — TELEPHONE (OUTPATIENT)
Dept: FAMILY MEDICINE CLINIC | Age: 67
End: 2022-07-06

## 2022-07-06 DIAGNOSIS — F17.210 SMOKES WITH GREATER THAN 40 PACK YEAR HISTORY: Primary | ICD-10-CM

## 2022-07-06 NOTE — TELEPHONE ENCOUNTER
----- Message from Cornelio Osei sent at 7/5/2022  2:14 PM EDT -----  Subject: Results Request    QUESTIONS  Results: Lung screen; Ordered by: Douglas Pate Performed: 2022-07-01  ---------------------------------------------------------------------------  --------------  Linda SEAMAN    [phone]; OK to leave message on voicemail  ---------------------------------------------------------------------------  --------------

## 2022-07-06 NOTE — TELEPHONE ENCOUNTER
Patient is aware of negative lung screening and is aware to follow up in a year.  Patient was advised to quit smoking

## 2022-07-12 ENCOUNTER — OFFICE VISIT (OUTPATIENT)
Dept: OBGYN | Age: 67
End: 2022-07-12
Payer: MEDICARE

## 2022-07-12 ENCOUNTER — HOSPITAL ENCOUNTER (OUTPATIENT)
Age: 67
Setting detail: SPECIMEN
Discharge: HOME | End: 2022-07-12
Payer: MEDICARE

## 2022-07-12 VITALS
HEIGHT: 57 IN | OXYGEN SATURATION: 98 % | BODY MASS INDEX: 20.54 KG/M2 | DIASTOLIC BLOOD PRESSURE: 76 MMHG | SYSTOLIC BLOOD PRESSURE: 124 MMHG | WEIGHT: 95.2 LBS | HEART RATE: 83 BPM

## 2022-07-12 DIAGNOSIS — A64 STI (SEXUALLY TRANSMITTED INFECTION): ICD-10-CM

## 2022-07-12 DIAGNOSIS — M81.0 AGE-RELATED OSTEOPOROSIS WITHOUT CURRENT PATHOLOGICAL FRACTURE: Primary | ICD-10-CM

## 2022-07-12 DIAGNOSIS — Z12.4 CERVICAL CANCER SCREENING: ICD-10-CM

## 2022-07-12 PROCEDURE — 99203 OFFICE O/P NEW LOW 30 MIN: CPT | Performed by: NURSE PRACTITIONER

## 2022-07-12 PROCEDURE — 87591 N.GONORRHOEAE DNA AMP PROB: CPT

## 2022-07-12 PROCEDURE — 87480 CANDIDA DNA DIR PROBE: CPT

## 2022-07-12 PROCEDURE — 87624 HPV HI-RISK TYP POOLED RSLT: CPT

## 2022-07-12 PROCEDURE — 1123F ACP DISCUSS/DSCN MKR DOCD: CPT | Performed by: NURSE PRACTITIONER

## 2022-07-12 PROCEDURE — G0145 SCR C/V CYTO,THINLAYER,RESCR: HCPCS

## 2022-07-12 PROCEDURE — G0439 PPPS, SUBSEQ VISIT: HCPCS

## 2022-07-12 PROCEDURE — 87510 GARDNER VAG DNA DIR PROBE: CPT

## 2022-07-12 PROCEDURE — G0463 HOSPITAL OUTPT CLINIC VISIT: HCPCS

## 2022-07-12 PROCEDURE — 87660 TRICHOMONAS VAGIN DIR PROBE: CPT

## 2022-07-12 PROCEDURE — 99397 PER PM REEVAL EST PAT 65+ YR: CPT

## 2022-07-12 PROCEDURE — 87491 CHLMYD TRACH DNA AMP PROBE: CPT

## 2022-07-12 RX ORDER — ALENDRONATE SODIUM 70 MG/1
70 TABLET ORAL
Qty: 4 TABLET | Refills: 5 | Status: SHIPPED | OUTPATIENT
Start: 2022-07-12

## 2022-07-12 RX ORDER — ESTRADIOL 0.1 MG/G
CREAM VAGINAL
Qty: 42.5 G | Refills: 3 | Status: SHIPPED | OUTPATIENT
Start: 2022-07-12

## 2022-07-12 ASSESSMENT — PATIENT HEALTH QUESTIONNAIRE - PHQ9
5. POOR APPETITE OR OVEREATING: 1
SUM OF ALL RESPONSES TO PHQ QUESTIONS 1-9: 15
3. TROUBLE FALLING OR STAYING ASLEEP: 3
SUM OF ALL RESPONSES TO PHQ QUESTIONS 1-9: 12
SUM OF ALL RESPONSES TO PHQ QUESTIONS 1-9: 15
8. MOVING OR SPEAKING SO SLOWLY THAT OTHER PEOPLE COULD HAVE NOTICED. OR THE OPPOSITE, BEING SO FIGETY OR RESTLESS THAT YOU HAVE BEEN MOVING AROUND A LOT MORE THAN USUAL: 0
10. IF YOU CHECKED OFF ANY PROBLEMS, HOW DIFFICULT HAVE THESE PROBLEMS MADE IT FOR YOU TO DO YOUR WORK, TAKE CARE OF THINGS AT HOME, OR GET ALONG WITH OTHER PEOPLE: 3
9. THOUGHTS THAT YOU WOULD BE BETTER OFF DEAD, OR OF HURTING YOURSELF: 3
1. LITTLE INTEREST OR PLEASURE IN DOING THINGS: 3
SUM OF ALL RESPONSES TO PHQ9 QUESTIONS 1 & 2: 6
6. FEELING BAD ABOUT YOURSELF - OR THAT YOU ARE A FAILURE OR HAVE LET YOURSELF OR YOUR FAMILY DOWN: 0
SUM OF ALL RESPONSES TO PHQ QUESTIONS 1-9: 15
7. TROUBLE CONCENTRATING ON THINGS, SUCH AS READING THE NEWSPAPER OR WATCHING TELEVISION: 1
4. FEELING TIRED OR HAVING LITTLE ENERGY: 1
2. FEELING DOWN, DEPRESSED OR HOPELESS: 3

## 2022-07-12 ASSESSMENT — ENCOUNTER SYMPTOMS
RESPIRATORY NEGATIVE: 1
GASTROINTESTINAL NEGATIVE: 1
ALLERGIC/IMMUNOLOGIC NEGATIVE: 1
EYES NEGATIVE: 1

## 2022-07-12 ASSESSMENT — COLUMBIA-SUICIDE SEVERITY RATING SCALE - C-SSRS
7. DID THIS OCCUR IN THE LAST THREE MONTHS: NO
1. WITHIN THE PAST MONTH, HAVE YOU WISHED YOU WERE DEAD OR WISHED YOU COULD GO TO SLEEP AND NOT WAKE UP?: YES
3. HAVE YOU BEEN THINKING ABOUT HOW YOU MIGHT KILL YOURSELF?: YES
4. HAVE YOU HAD THESE THOUGHTS AND HAD SOME INTENTION OF ACTING ON THEM?: YES
2. HAVE YOU ACTUALLY HAD ANY THOUGHTS OF KILLING YOURSELF?: YES
6. HAVE YOU EVER DONE ANYTHING, STARTED TO DO ANYTHING, OR PREPARED TO DO ANYTHING TO END YOUR LIFE?: YES
5. HAVE YOU STARTED TO WORK OUT OR WORKED OUT THE DETAILS OF HOW TO KILL YOURSELF? DO YOU INTEND TO CARRY OUT THIS PLAN?: NO

## 2022-07-12 NOTE — PROGRESS NOTES
Psychiatric/Behavioral: Negative. Objective:     Vitals:    07/12/22 1342   BP: 124/76   Site: Left Upper Arm   Position: Sitting   Cuff Size: Medium Adult   Pulse: 83   SpO2: 98%   Weight: 95 lb 3.2 oz (43.2 kg)   Height: 4' 9\" (1.448 m)      Physical Exam  Vitals and nursing note reviewed. Constitutional:       Appearance: Normal appearance. HENT:      Head: Normocephalic. Cardiovascular:      Rate and Rhythm: Normal rate and regular rhythm. Pulses: Normal pulses. Heart sounds: Normal heart sounds. Pulmonary:      Effort: Pulmonary effort is normal.      Breath sounds: Normal breath sounds. Abdominal:      General: Abdomen is flat. Palpations: Abdomen is soft. Genitourinary:     General: Normal vulva. Musculoskeletal:         General: Normal range of motion. Cervical back: Normal range of motion and neck supple. Skin:     General: Skin is warm and dry. Neurological:      Mental Status: She is alert. Psychiatric:         Mood and Affect: Mood normal.       Inspection negative. No nipple discharge or bleeding. No palpable mass    Vulva:normal appearance, no lesions  Vagina pink rugae, no abnormal discharge  Cervix no lesions, no CMT  Uterus: small, mobile NT  Ovaries: small, NT, no adnexal tenderness    Post Menopausal Yes    Sexually Active:Yes    Any bleeding or pain with intercourse: No    Last Sexual Encounter Date 7 months ago    Protected or Unprotected: no condoms; pt. postmenopausal    Last Pap: unknown    Last HPV: unknown    High Risk HPV: unknown    Last Mammogram: 2/22/22/ neg    Last Dexascan: 2/22/22 L/S -0.8; left hip -0.2; fem hip -2.4    Last Colonoscopy cologuard orderd by patients NP    Do you do self breast exams: No      Assessment:      Diagnosis Orders   1. Age-related osteoporosis without current pathological fracture  alendronate (FOSAMAX) 70 MG tablet   2. Cervical cancer screening  PAP SMEAR   3.  STI (sexually transmitted infection) Vaginitis DNA Probe    Chlamydia Trachomatis & Neisseria gonorrhoeae (GC) by amplified detection   4. Well woman examination  5.postmenopausal  6 hx. Of bipolar, depression and suicide attempts  7. Heavy smoker  9. Hx. Of being an alcoholic        Plan:   1. Cervical pap with HPV and if neg, then repeat cotesting in 3 years  2. Vaginal DNA  3. GC/chlamydia  4. offered serology STi testing; refused  5. Will see therapist ASAP  6. Start Fosamax 70 mg 1/weekly   7I spent 30 min. With pt. Discussing medical status and PE  8. RTO 1 yr. Prn  9. Enc. Pt. To stop smoking    No follow-ups on file. Orders Placed This Encounter   Procedures    Vaginitis DNA Probe     Standing Status:   Future     Number of Occurrences:   1     Standing Expiration Date:   7/12/2023    Chlamydia Trachomatis & Neisseria gonorrhoeae (GC) by amplified detection     Standing Status:   Future     Number of Occurrences:   1     Standing Expiration Date:   7/12/2023    PAP SMEAR     Patient History:    No LMP recorded. Patient is postmenopausal.  OBGYN Status: Postmenopausal  Past Surgical History:  1979: OVARY REMOVAL      Comment:  unsure which side      Social History    Tobacco Use      Smoking status: Current Some Day Smoker        Packs/day: 1.00        Years: 48.00        Pack years: 50        Types: Cigarettes      Smokeless tobacco: Never Used      Tobacco comment: 1-5 daily        Standing Status:   Future     Number of Occurrences:   1     Standing Expiration Date:   7/12/2023     Order Specific Question:   Collection Type     Answer: Thin Prep     Order Specific Question:   Prior Abnormal Pap Test     Answer:   No     Order Specific Question:   Screening or Diagnostic     Answer:   Screening     Order Specific Question:   HPV Requested?      Answer:   Yes     Order Specific Question:   High Risk Patient     Answer:   N/A       Electronically signed by:  ALFONZO Ballard CNP

## 2022-07-13 LAB
C TRACH DNA GENITAL QL NAA+PROBE: NEGATIVE
CANDIDA SPECIES, DNA PROBE: NEGATIVE
GARDNERELLA VAGINALIS, DNA PROBE: NEGATIVE
HPV SAMPLE: ABNORMAL
HPV, GENOTYPE 16: NOT DETECTED
HPV, GENOTYPE 18: NOT DETECTED
HPV, HIGH RISK OTHER: DETECTED
HPV, INTERPRETATION: ABNORMAL
N. GONORRHOEAE DNA: NEGATIVE
SOURCE: NORMAL
SPECIMEN DESCRIPTION: ABNORMAL
SPECIMEN DESCRIPTION: NORMAL
TRICHOMONAS VAGINALIS DNA: NEGATIVE

## 2022-07-19 LAB — CYTOLOGY REPORT: NORMAL

## 2022-08-03 ENCOUNTER — HOSPITAL ENCOUNTER (EMERGENCY)
Age: 67
Discharge: HOME | End: 2022-08-04
Attending: EMERGENCY MEDICINE
Payer: MEDICARE

## 2022-08-03 ENCOUNTER — APPOINTMENT (OUTPATIENT)
Dept: CT IMAGING | Age: 67
End: 2022-08-03
Payer: MEDICARE

## 2022-08-03 DIAGNOSIS — F10.929 ACUTE ALCOHOLIC INTOXICATION WITH COMPLICATION (HCC): ICD-10-CM
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
## 2022-08-03 LAB
ABSOLUTE EOS #: 0.21 K/UL (ref 0–0.44)
ABSOLUTE IMMATURE GRANULOCYTE: 0.05 K/UL (ref 0–0.3)
ABSOLUTE LYMPH #: 3.59 K/UL (ref 1.1–3.7)
ABSOLUTE MONO #: 0.58 K/UL (ref 0.1–1.2)
ANION GAP SERPL CALCULATED.3IONS-SCNC: 13 MMOL/L (ref 9–17)
BASOPHILS # BLD: 0 % (ref 0–2)
BASOPHILS ABSOLUTE: 0.04 K/UL (ref 0–0.2)
BUN BLDV-MCNC: 17 MG/DL (ref 8–23)
BUN/CREAT BLD: 25 (ref 9–20)
CALCIUM SERPL-MCNC: 9.6 MG/DL (ref 8.6–10.4)
CHLORIDE BLD-SCNC: 107 MMOL/L (ref 98–107)
CO2: 24 MMOL/L (ref 20–31)
CREAT SERPL-MCNC: 0.68 MG/DL (ref 0.5–0.9)
EKG ATRIAL RATE: 68 BPM
EKG P AXIS: 45 DEGREES
EKG P-R INTERVAL: 194 MS
EKG Q-T INTERVAL: 402 MS
EKG QRS DURATION: 74 MS
EKG QTC CALCULATION (BAZETT): 427 MS
EKG R AXIS: 49 DEGREES
EKG T AXIS: 53 DEGREES
EKG VENTRICULAR RATE: 68 BPM
EOSINOPHILS RELATIVE PERCENT: 2 % (ref 1–4)
ETHANOL: 181 MG/DL
GFR AFRICAN AMERICAN: >60 ML/MIN
GFR NON-AFRICAN AMERICAN: >60 ML/MIN
GFR SERPL CREATININE-BSD FRML MDRD: ABNORMAL ML/MIN/{1.73_M2}
GLUCOSE BLD-MCNC: 97 MG/DL (ref 70–99)
HCT VFR BLD CALC: 45.9 % (ref 36.3–47.1)
HEMOGLOBIN: 15.5 G/DL (ref 11.9–15.1)
IMMATURE GRANULOCYTES: 1 %
LYMPHOCYTES # BLD: 37 % (ref 24–43)
MCH RBC QN AUTO: 31.1 PG (ref 25.2–33.5)
MCHC RBC AUTO-ENTMCNC: 33.8 G/DL (ref 25.2–33.5)
MCV RBC AUTO: 92 FL (ref 82.6–102.9)
MONOCYTES # BLD: 6 % (ref 3–12)
NRBC AUTOMATED: 0 PER 100 WBC
PDW BLD-RTO: 13.1 % (ref 11.8–14.4)
PLATELET # BLD: 254 K/UL (ref 138–453)
PMV BLD AUTO: 10.5 FL (ref 8.1–13.5)
POTASSIUM SERPL-SCNC: 4 MMOL/L (ref 3.7–5.3)
RBC # BLD: 4.99 M/UL (ref 3.95–5.11)
SEG NEUTROPHILS: 54 % (ref 36–65)
SEGMENTED NEUTROPHILS ABSOLUTE COUNT: 5.28 K/UL (ref 1.5–8.1)
SODIUM BLD-SCNC: 144 MMOL/L (ref 135–144)
TROPONIN, HIGH SENSITIVITY: <6 NG/L (ref 0–14)
WBC # BLD: 9.8 K/UL (ref 3.5–11.3)

## 2022-08-03 PROCEDURE — 36415 COLL VENOUS BLD VENIPUNCTURE: CPT

## 2022-08-03 PROCEDURE — 80306 DRUG TEST PRSMV INSTRMNT: CPT

## 2022-08-03 PROCEDURE — 81001 URINALYSIS AUTO W/SCOPE: CPT

## 2022-08-03 PROCEDURE — G0480 DRUG TEST DEF 1-7 CLASSES: HCPCS

## 2022-08-03 PROCEDURE — 84484 ASSAY OF TROPONIN QUANT: CPT

## 2022-08-03 PROCEDURE — 85025 COMPLETE CBC W/AUTO DIFF WBC: CPT

## 2022-08-03 PROCEDURE — 93005 ELECTROCARDIOGRAM TRACING: CPT | Performed by: EMERGENCY MEDICINE

## 2022-08-03 PROCEDURE — 72125 CT NECK SPINE W/O DYE: CPT

## 2022-08-03 PROCEDURE — 99284 EMERGENCY DEPT VISIT MOD MDM: CPT

## 2022-08-03 PROCEDURE — 80048 BASIC METABOLIC PNL TOTAL CA: CPT

## 2022-08-03 PROCEDURE — 70450 CT HEAD/BRAIN W/O DYE: CPT

## 2022-08-03 ASSESSMENT — LIFESTYLE VARIABLES: HOW OFTEN DO YOU HAVE A DRINK CONTAINING ALCOHOL: NEVER

## 2022-08-03 ASSESSMENT — PAIN - FUNCTIONAL ASSESSMENT: PAIN_FUNCTIONAL_ASSESSMENT: NONE - DENIES PAIN

## 2022-08-03 ASSESSMENT — PAIN DESCRIPTION - PAIN TYPE: TYPE: ACUTE PAIN

## 2022-08-04 VITALS
SYSTOLIC BLOOD PRESSURE: 121 MMHG | DIASTOLIC BLOOD PRESSURE: 74 MMHG | HEIGHT: 57 IN | HEART RATE: 74 BPM | OXYGEN SATURATION: 99 % | RESPIRATION RATE: 16 BRPM | WEIGHT: 95.2 LBS | BODY MASS INDEX: 20.54 KG/M2 | TEMPERATURE: 97.3 F

## 2022-08-04 LAB
-: NORMAL
AMPHETAMINE SCREEN URINE: NEGATIVE
BACTERIA: NORMAL
BARBITURATE SCREEN URINE: NEGATIVE
BENZODIAZEPINE SCREEN, URINE: NEGATIVE
BILIRUBIN URINE: NEGATIVE
BUPRENORPHINE URINE: NEGATIVE
CANNABINOID SCREEN URINE: NEGATIVE
COCAINE METABOLITE, URINE: NEGATIVE
EPITHELIAL CELLS UA: NORMAL /HPF (ref 0–5)
GLUCOSE URINE: NEGATIVE
KETONES, URINE: NEGATIVE
LEUKOCYTE ESTERASE, URINE: NEGATIVE
METHADONE SCREEN, URINE: NEGATIVE
METHAMPHETAMINE, URINE: NEGATIVE
NITRITE, URINE: NEGATIVE
OPIATES, URINE: NEGATIVE
OXYCODONE SCREEN URINE: NEGATIVE
PH UA: 5.5 (ref 5–6)
PHENCYCLIDINE, URINE: NEGATIVE
PROPOXYPHENE, URINE: NEGATIVE
PROTEIN UA: NEGATIVE
RBC UA: NORMAL /HPF (ref 0–4)
SPECIFIC GRAVITY UA: 1 (ref 1.01–1.02)
TEST INFORMATION: NORMAL
TRICYCLIC ANTIDEPRESSANTS, UR: NEGATIVE
URINE HGB: NEGATIVE
UROBILINOGEN, URINE: NORMAL
WBC UA: NORMAL /HPF (ref 0–4)

## 2022-08-04 ASSESSMENT — ENCOUNTER SYMPTOMS
VOMITING: 0
SHORTNESS OF BREATH: 0
NAUSEA: 0

## 2022-08-04 NOTE — DISCHARGE INSTRUCTIONS
Follow-up with your PCP within 2 days. Return to the emergency for headaches, nausea vomiting, chest pain or difficulty breathing, new numbness or weakness or any other acute concerns.

## 2022-08-04 NOTE — ED PROVIDER NOTES
[facility] ED  [address]  [place] [address] [place]  Phone: [phone]  eMERGENCY dEPARTMENT eNCOUnter      Pt Name: Dirk Holt  MRN: [id number]  Celenagfbubba 1955  Date of evaluation: 8/4/22      CHIEF COMPLAINT     Chief Complaint   Patient presents with    Fall       HISTORY OF PRESENT ILLNESS    Dirk Holt is a 77 y.o. female who presents the emergency department via EMS. The patient apparently was walking and fell down in front of a . She admits to drinking some alcohol this evening she states that she had [address] ice tea and 2 beers. They felt that she was clinically intoxicated. Patient denies any injury at present she denies any headache loss of consciousness neck pain chest pain difficulty breathing or other injury. She states that she is very angry with her family members because they would not give her a ride to the store so she was walking and she reports that is approximately 4 miles. Denies any suicidal or homicidal ideation or hallucinations. The patient is oriented to person place and situation appears to have medical decision-making capacity although she does appear intoxicated. She also denies illicit drug use. REVIEW OF SYSTEMS     Review of Systems   Constitutional:  Negative for fever. HENT:  Negative for congestion. Eyes:  Negative for visual disturbance. Respiratory:  Negative for shortness of breath. Cardiovascular:  Negative for chest pain. Gastrointestinal:  Negative for nausea and vomiting. Skin:  Negative for rash. Neurological:  Negative for headaches. Psychiatric/Behavioral:  Negative for confusion. PAST MEDICAL HISTORY    has a past medical history of Anxiety, Bipolar disorder ([facility] [address] Depression, and Stress incontinence. SURGICAL HISTORY      has a past surgical history that includes Ovary removal (1979).     CURRENT MEDICATIONS       Discharge Medication List as of 8/4/2022  6:36 AM        CONTINUE these medications which have NOT CHANGED    Details   alendronate (FOSAMAX) 70 MG tablet Take 1 tablet by mouth every 7 days, Disp-4 tablet, R-5Normal      estradiol (ESTRACE VAGINAL) 0.1 MG/GM vaginal cream Apply 1/2 applicatorfull intravaginally at hs 2 x week, Disp-42.5 g, R-3Normal      DULoxetine (CYMBALTA) 20 MG extended release capsule take 1 capsule by mouth once daily IF DAYTIME SEDATION TAKE AT BEDTIMEHistorical Med      hydrOXYzine (VISTARIL) 25 MG capsule take 1 capsule by mouth three times a day if needed anxietyHistorical Med             ALLERGIES     has No Known Allergies. FAMILY HISTORY     She indicated that her mother is . She indicated that her father is . family history includes Breast Cancer (age of onset: 39) in her daughter; Diabetes in her mother; Lung Disease in her father; No Known Problems in her daughter, maternal aunt, maternal aunt, maternal aunt, maternal aunt, maternal aunt, maternal aunt, paternal aunt, paternal aunt, sister, sister, sister, and sister; Parkinson's Disease in her mother. SOCIAL HISTORY      reports that she has been smoking cigarettes. She has a 48.00 pack-year smoking history. She has never used smokeless tobacco. She reports current alcohol use. She reports that she does not currently use drugs. PHYSICAL EXAM     INITIAL VITALS:  height is 4' 9\" (1.448 m) and weight is 95 lb 3.2 oz (43.2 kg). Her tympanic temperature is 97.3 °F (36.3 °C). Her blood pressure is 121/74 and her pulse is 74. Her respiration is 16 and oxygen saturation is 99%. Physical Exam  Vitals reviewed. Constitutional:       General: She is not in acute distress. Appearance: She is not ill-appearing. Comments: Appears intoxicated. HENT:      Head: Normocephalic and atraumatic. Right Ear: Tympanic membrane normal.      Left Ear: Tympanic membrane normal.      Nose:      Comments: Facial bones stable, no septal hematoma, no hemotympanum.   No midline cervical spinal tenderness. Mouth/Throat:      Mouth: Mucous membranes are dry. Eyes:      Extraocular Movements: Extraocular movements intact. Pupils: Pupils are equal, round, and reactive to light. Cardiovascular:      Rate and Rhythm: Normal rate and regular rhythm. Pulses: Normal pulses. Heart sounds: Normal heart sounds. No murmur heard. Pulmonary:      Effort: Pulmonary effort is normal. No respiratory distress. Breath sounds: Normal breath sounds. Abdominal:      Palpations: Abdomen is soft. Tenderness: There is no abdominal tenderness. There is no guarding or rebound. Musculoskeletal:         General: No deformity or signs of injury. Normal range of motion. Cervical back: Normal range of motion and neck supple. No tenderness. Skin:     General: Skin is warm and dry. Capillary Refill: Capillary refill takes less than 2 seconds. Findings: No rash. Neurological:      General: No focal deficit present. Mental Status: She is alert and oriented to person, place, and time. Motor: No weakness. Comments: Appears clinically intoxicated. DIFFERENTIAL DIAGNOSIS / MDM / EMERGENCY DEPARTMENT COURSE:     Plan for CT of the head and neck EKG as well as labs and urinalysis. Patient denies suicidal or homicidal ideation. Plan to clear from a medical perspective. Patient's labs without clinically significant abnormality except for an alcohol level of 181. EKG did not show any QT prolongation or evidence of Brugada or WPW. Patient is cleared from a trauma perspective. Patient does not have someone  from the emergency department therefore we will wait until she is clinically sober and arrange taxi transportation home. Around 6:00 in the morning the patient was clinically sober and able to walk and talk without any focal neurological deficits. Patient was able to tolerate a p.o. challenge.   Taxicab was arranged for the patient home by nursing staff. At this point I feel she is appropriate for discharge home. I have reviewed the disposition diagnosis with the patient and or their family/guardian. I have answered their questions and givendischarge instructions. They voiced understanding of these instructions and did not have any further questions or complaints. DIAGNOSTIC RESULTS     EKG: All EKG's are interpreted by the Emergency Department Physician who either signs or Co-signs this chart inthe absence of a cardiologist.    Twelve-lead EKG normal sinus rhythm at 68 bpm.  Normal intervals normal axis. No acute ST elevations depressions or T wave inversions interpretation is a normal twelve-lead EKG. RADIOLOGY:   Radiologist interpretation of radiologic studies:     CT HEAD WO CONTRAST    Result Date: 8/3/2022  EXAMINATION: CT OF THE HEAD WITHOUT CONTRAST; CT OF THE CERVICAL SPINE WITHOUT CONTRAST 8/3/2022 10:24 pm TECHNIQUE: CT of the head was performed without the administration of intravenous contrast. Automated exposure control, iterative reconstruction, and/or weight based adjustment of the mA/kV was utilized to reduce the radiation dose to as low as reasonably achievable.; CT of the cervical spine was performed without the administration of intravenous contrast. Multiplanar reformatted images are provided for review. Automated exposure control, iterative reconstruction, and/or weight based adjustment of the mA/kV was utilized to reduce the radiation dose to as low as reasonably achievable. COMPARISON: None. HISTORY: ORDERING SYSTEM PROVIDED HISTORY: fall / intoxication TECHNOLOGIST PROVIDED HISTORY: fall / intoxication Decision Support Exception - unselect if not a suspected or confirmed emergency medical condition->Emergency Medical Condition (MA) FINDINGS: Head CT: There is no acute infarction, intracranial hemorrhage or intracranial mass lesion. No mass effect, midline shift or extra-axial collection is noted.  The brain parenchyma is normal. The cerebellar tonsils are in normal position. The ventricles, sulci, and cisterns are within normal limits in size and configuration. The globes and orbits are within normal limits. The visualized extracranial structures including paranasal sinuses and mastoid air cells are unremarkable. No fracture is identified. Cervical spine CT: BONES/ALIGNMENT: There is no acute fracture or traumatic malalignment. DEGENERATIVE CHANGES: Moderate disc and facet degenerative at C4-C5. Grade 1 retrolisthesis at C5-C6 SOFT TISSUES: There is no prevertebral soft tissue swelling. Head CT: No acute intracranial abnormality. No evidence for acute intracranial hemorrhage, territorial infarction or intracranial mass lesion. Cervical CT: No acute abnormality of the cervical spine. No fracture. CT CERVICAL SPINE WO CONTRAST    Result Date: 8/3/2022  EXAMINATION: CT OF THE HEAD WITHOUT CONTRAST; CT OF THE CERVICAL SPINE WITHOUT CONTRAST 8/3/2022 10:24 pm TECHNIQUE: CT of the head was performed without the administration of intravenous contrast. Automated exposure control, iterative reconstruction, and/or weight based adjustment of the mA/kV was utilized to reduce the radiation dose to as low as reasonably achievable.; CT of the cervical spine was performed without the administration of intravenous contrast. Multiplanar reformatted images are provided for review. Automated exposure control, iterative reconstruction, and/or weight based adjustment of the mA/kV was utilized to reduce the radiation dose to as low as reasonably achievable. COMPARISON: None. HISTORY: ORDERING SYSTEM PROVIDED HISTORY: fall / intoxication TECHNOLOGIST PROVIDED HISTORY: fall / intoxication Decision Support Exception - unselect if not a suspected or confirmed emergency medical condition->Emergency Medical Condition (MA) FINDINGS: Head CT: There is no acute infarction, intracranial hemorrhage or intracranial mass lesion.  No mass effect, midline shift or extra-axial collection is noted. The brain parenchyma is normal. The cerebellar tonsils are in normal position. The ventricles, sulci, and cisterns are within normal limits in size and configuration. The globes and orbits are within normal limits. The visualized extracranial structures including paranasal sinuses and mastoid air cells are unremarkable. No fracture is identified. Cervical spine CT: BONES/ALIGNMENT: There is no acute fracture or traumatic malalignment. DEGENERATIVE CHANGES: Moderate disc and facet degenerative at C4-C5. Grade 1 retrolisthesis at C5-C6 SOFT TISSUES: There is no prevertebral soft tissue swelling. Head CT: No acute intracranial abnormality. No evidence for acute intracranial hemorrhage, territorial infarction or intracranial mass lesion. Cervical CT: No acute abnormality of the cervical spine. No fracture.        LABS:  Results for orders placed or performed during the hospital encounter of 08/03/22   CBC with Auto Differential   Result Value Ref Range    WBC 9.8 3.5 - 11.3 k/uL    RBC 4.99 3.95 - 5.11 m/uL    Hemoglobin 15.5 (H) 11.9 - 15.1 g/dL    Hematocrit 45.9 36.3 - 47.1 %    MCV 92.0 82.6 - 102.9 fL    MCH 31.1 25.2 - 33.5 pg    MCHC 33.8 (H) 25.2 - 33.5 g/dL    RDW 13.1 11.8 - 14.4 %    Platelets 142 863 - 260 k/uL    MPV 10.5 8.1 - 13.5 fL    NRBC Automated 0.0 0.0 per 100 WBC    Seg Neutrophils 54 36 - 65 %    Lymphocytes 37 24 - 43 %    Monocytes 6 3 - 12 %    Eosinophils % 2 1 - 4 %    Basophils 0 0 - 2 %    Immature Granulocytes 1 (H) 0 %    Segs Absolute 5.28 1.50 - 8.10 k/uL    Absolute Lymph # 3.59 1.10 - 3.70 k/uL    Absolute Mono # 0.58 0.10 - 1.20 k/uL    Absolute Eos # 0.21 0.00 - 0.44 k/uL    Basophils Absolute 0.04 0.00 - 0.20 k/uL    Absolute Immature Granulocyte 0.05 0.00 - 0.30 k/uL   Basic Metabolic Panel   Result Value Ref Range    Glucose 97 70 - 99 mg/dL    BUN 17 8 - 23 mg/dL    Creatinine 0.68 0.50 - 0.90 mg/dL    Bun/Cre Ratio 25 (H) 9 - 20 Calcium 9.6 8.6 - 10.4 mg/dL    Sodium 144 135 - 144 mmol/L    Potassium 4.0 3.7 - 5.3 mmol/L    Chloride 107 98 - 107 mmol/L    CO2 24 20 - 31 mmol/L    Anion Gap 13 9 - 17 mmol/L    GFR Non-African American >60 >60 mL/min    GFR African American >60 >60 mL/min    GFR Comment         Troponin   Result Value Ref Range    Troponin, High Sensitivity <6 0 - 14 ng/L   Ethanol   Result Value Ref Range    Ethanol 181 (H) <10 mg/dL   Urinalysis with Reflex to Culture    Specimen: Urine   Result Value Ref Range    Glucose, Ur NEGATIVE NEGATIVE    Bilirubin Urine NEGATIVE NEGATIVE    Ketones, Urine NEGATIVE NEGATIVE    Specific Gravity, UA 1.005 (L) 1.010 - 1.025    Urine Hgb NEGATIVE NEGATIVE    pH, UA 5.5 5.0 - 6.0    Protein, UA NEGATIVE NEGATIVE    Urobilinogen, Urine Normal Normal    Nitrite, Urine NEGATIVE NEGATIVE    Leukocyte Esterase, Urine NEGATIVE NEGATIVE   Urine Drug Screen   Result Value Ref Range    Amphetamine Screen, Ur NEGATIVE NEGATIVE    Barbiturate Screen, Ur NEGATIVE NEGATIVE    Benzodiazepine Screen, Urine NEGATIVE NEGATIVE    Cocaine Metabolite, Urine NEGATIVE NEGATIVE    Methadone Screen, Urine NEGATIVE NEGATIVE    Opiates, Urine NEGATIVE NEGATIVE    Phencyclidine, Urine NEGATIVE NEGATIVE    Propoxyphene, Urine NEGATIVE NEGATIVE    Cannabinoid Scrn, Ur NEGATIVE NEGATIVE    Oxycodone Screen, Ur NEGATIVE NEGATIVE    Methamphetamine, Urine NEGATIVE NEGATIVE    Tricyclic Antidepressants, Urine NEGATIVE NEGATIVE    Buprenorphine Urine NEGATIVE NEGATIVE    Test Information       The following threshold concentrations are established for the drug assays:   Microscopic Urinalysis   Result Value Ref Range    WBC, UA None 0 - 4 /HPF    RBC, UA None 0 - 4 /HPF    Epithelial Cells UA None 0 - 5 /HPF    Bacteria, UA None None    -         EKG 12 Lead   Result Value Ref Range    Ventricular Rate 68 BPM    Atrial Rate 68 BPM    P-R Interval 194 ms    QRS Duration 74 ms    Q-T Interval 402 ms    QTc Calculation (Felipa) 427 ms    P Axis 45 degrees    R Axis 49 degrees    T Axis 53 degrees       EMERGENCY DEPARTMENT COURSE:   Vitals:    Vitals:    08/03/22 2101 08/03/22 2200 08/04/22 0621   BP: 132/68 123/70 121/74   Pulse: 84 82 74   Resp: 18 18 16   Temp: 97.3 °F (36.3 °C)     TempSrc: Tympanic     SpO2: 98% 95% 99%   Weight: 95 lb 3.2 oz (43.2 kg)     Height: 4' 9\" (1.448 m)       -------------------------  BP: 121/74, Temp: 97.3 °F (36.3 °C), Heart Rate: 74, Resp: 16    CONSULTS:  None    PROCEDURES:  None    FINAL IMPRESSION      1. Fall, initial encounter    2. Acute alcoholic intoxication with complication [facility])          DISPOSITION/PLAN   DISPOSITION Decision To Discharge 08/04/2022 06:32:54 AM      PATIENT REFERRED TO:  ALFONZO Zaman - CNP  [address] [facility] [id number]  [phone]    In 2 days      DISCHARGE MEDICATIONS:  Discharge Medication List as of 8/4/2022  6:36 AM          There are no active hospital problems to display for this patient.       (Please note that portions of this note were completed with avoice recognition program.  Efforts were made to edit the dictations but occasionally words are mis-transcribed.)    Edward Thomas MD, [facility]  Attending Emergency Medicine Physician        Edward Thomas MD  08/04/22 0208

## 2022-09-26 ENCOUNTER — OFFICE VISIT (OUTPATIENT)
Dept: OPTOMETRY | Age: 67
End: 2022-09-26
Payer: COMMERCIAL

## 2022-09-26 DIAGNOSIS — H53.8 BLURRED VISION, BILATERAL: ICD-10-CM

## 2022-09-26 DIAGNOSIS — H25.813 COMBINED FORMS OF AGE-RELATED CATARACT OF BOTH EYES: ICD-10-CM

## 2022-09-26 DIAGNOSIS — H52.4 HYPEROPIA OF BOTH EYES WITH REGULAR ASTIGMATISM AND PRESBYOPIA: Primary | ICD-10-CM

## 2022-09-26 DIAGNOSIS — H52.223 HYPEROPIA OF BOTH EYES WITH REGULAR ASTIGMATISM AND PRESBYOPIA: Primary | ICD-10-CM

## 2022-09-26 DIAGNOSIS — H52.03 HYPEROPIA OF BOTH EYES WITH REGULAR ASTIGMATISM AND PRESBYOPIA: Primary | ICD-10-CM

## 2022-09-26 PROCEDURE — 92004 COMPRE OPH EXAM NEW PT 1/>: CPT | Performed by: OPTOMETRIST

## 2022-09-26 PROCEDURE — 92015 DETERMINE REFRACTIVE STATE: CPT | Performed by: OPTOMETRIST

## 2022-09-26 ASSESSMENT — REFRACTION_WEARINGRX
OD_SPHERE: +4.25
OD_AXIS: 090
OS_CYLINDER: -0.75
OS_SPHERE: +3.50
OD_CYLINDER: -1.00
SPECS_TYPE: BIFOCAL
OS_ADD: +2.75
OS_AXIS: 080
OD_ADD: +2.75

## 2022-09-26 ASSESSMENT — TONOMETRY
OD_IOP_MMHG: 16
OS_IOP_MMHG: 18
IOP_METHOD: NON-CONTACT AIR PUFF

## 2022-09-26 ASSESSMENT — KERATOMETRY
OD_K1POWER_DIOPTERS: 45.00
OD_K2POWER_DIOPTERS: 45.75
OD_AXISANGLE2_DEGREES: 013
OS_AXISANGLE2_DEGREES: 179
OD_AXISANGLE_DEGREES: 103
OS_K1POWER_DIOPTERS: 45.00
METHOD_AUTO_MANUAL: AUTOMATED
OS_K2POWER_DIOPTERS: 46.00
OS_AXISANGLE_DEGREES: 089

## 2022-09-26 ASSESSMENT — VISUAL ACUITY
METHOD: SNELLEN - LINEAR
OD_CC: 20/50
CORRECTION_TYPE: GLASSES
OS_CC: 20/80

## 2022-09-26 ASSESSMENT — REFRACTION_MANIFEST
OD_SPHERE: +3.50
OD_ADD: +2.75
OS_AXIS: 080
OS_ADD: +2.75
OS_SPHERE: +2.00
OD_AXIS: 090
OD_CYLINDER: -1.00
OS_CYLINDER: -0.75

## 2022-09-26 ASSESSMENT — SLIT LAMP EXAM - LIDS
COMMENTS: NORMAL
COMMENTS: NORMAL

## 2022-09-26 NOTE — PROGRESS NOTES
Farhat Burns presents today for   Chief Complaint   Patient presents with    Blurred Vision   . HPI       Blurred Vision              Laterality: both eyes    Quality: blurred and difficult to focus              Comments    Last Vision Exam: 1 year  Last Ophthalmology Exam: n/a  Last Filled Glasses Rx: 1 years, wearing an 6-8yr pair now  Insurance: [employer] Medicare   Update: update glasses, most recent pair of glasses are broken so she is relying on a really old (6-8yr) pair of glasses to get by. Current Outpatient Medications   Medication Sig Dispense Refill    alendronate (FOSAMAX) 70 MG tablet Take 1 tablet by mouth every 7 days 4 tablet 5    estradiol (ESTRACE VAGINAL) 0.1 MG/GM vaginal cream Apply 1/2 applicatorfull intravaginally at hs 2 x week (Patient not taking: Reported on 8/3/2022) 42.5 g 3    DULoxetine (CYMBALTA) 20 MG extended release capsule take 1 capsule by mouth once daily IF DAYTIME SEDATION TAKE AT BEDTIME      hydrOXYzine (VISTARIL) 25 MG capsule take 1 capsule by mouth three times a day if needed anxiety       No current facility-administered medications for this visit.          Family History   Problem Relation Age of Onset    Diabetes Mother     Parkinson's Disease Mother     Lung Disease Father     No Known Problems Sister     Breast Cancer Daughter 39    No Known Problems Sister     No Known Problems Sister     No Known Problems Sister     No Known Problems Daughter     No Known Problems Paternal Aunt     No Known Problems Paternal Aunt     No Known Problems Maternal Aunt     No Known Problems Maternal Aunt     No Known Problems Maternal Aunt     No Known Problems Maternal Aunt     No Known Problems Maternal Aunt     No Known Problems Maternal Aunt      Social History     Socioeconomic History    Marital status: Single     Spouse name: None    Number of children: None    Years of education: None    Highest education level: None   Tobacco Use    Smoking status: Some Days     Packs/day: 1.00     Years: 48.00     Pack years: 48.00     Types: Cigarettes    Smokeless tobacco: Never    Tobacco comments:     1-5 daily    Vaping Use    Vaping Use: Never used   Substance and Sexual Activity    Alcohol use: Yes     Comment: 1 beer per day    Drug use: Not Currently    Sexual activity: Not Currently     Partners: Male     Social Determinants of Health     Physical Activity: Insufficiently Active    Days of Exercise per Week: 2 days    Minutes of Exercise per Session: 10 min       Past Medical History:   Diagnosis Date    Anxiety     Bipolar disorder ([facility]     Depression     Stress incontinence          Main Ophthalmology Exam       External Exam         Right Left    External Normal Normal              Slit Lamp Exam         Right Left    Lids/Lashes Normal Normal    Conjunctiva/Sclera White and quiet White and quiet    Cornea Clear Clear    Anterior Chamber Deep and quiet Deep and quiet    Iris Round and reactive Round and reactive    Lens 1+ Nuclear sclerosis 1+ Nuclear sclerosis-2    Vitreous Normal Normal              Fundus Exam         Right Left    Disc Normal Normal    C/D Ratio 0.2 0.2    Macula Normal Normal    Vessels Normal Normal                   <div id=\"MAIN_EXAM_REVIEWED\"></div>     Tonometry       Tonometry (Non-contact air puff, 3:17 PM)         Right Left    Pressure 16 18      Right / Left  IOPg 18.0 / 20.6   CH 11.9 / 12.2  WS 6.8 / 7.0                       Visual Acuity (Snellen - Linear)         Right Left    Dist cc 20/30 20/80    Near cc 20/50       Correction: Glasses          Keratometry       Keratometry (Automated)         K1 Axis K2 Axis    Right 45.00 013 45.75 103    Left 45.00 179 46.00 089                  Pupils       Pupils         Pupils    Right PERRL    Left PERRL                  Not recorded       Not recorded         Ophthalmology Exam       Wearing Rx         Sphere Cylinder Axis Add    Right +4.25 -1.00 090 +2.75    Left +3.50 -0.75 080 +2.75 Age: 8yrs    Type: Bifocal                    Manifest Refraction       Manifest Refraction         Sphere Cylinder [place] Dist [facility] Add Near [facility]    Right +3.50 -1.00 090 20/25 +2.75     Left +2.00 -0.75 080 20/25 +2.75 20/20ou              Manifest Refraction #2 (Auto)         Sphere Cylinder [place] [facility] Add Near [facility]    Right +3.50 -0.75 078       Left +1.75 -0.25 079                      Final Rx         Sphere Cylinder Axis Add    Right +3.50 -1.00 090 +2.75    Left +2.00 -0.75 080 +2.75      Type: Bifocal    Expiration Date: 9/26/2024            Neuro/Psych       Neuro/Psych       Oriented x3: Yes    Mood/Affect: Normal                    No orders of the defined types were placed in this encounter. IMPRESSION:  1. Hyperopia of both eyes with regular astigmatism and presbyopia [H52.03, H52.4, H52.223 (ICD-10-CM)]    2. Blurred vision, bilateral    3. Combined forms of age-related cataract of both eyes        PLAN:    New glasses recommended   \"  Monitor for future progression and visual significance. Counseled patient that more glare may be noticed and more light may be needed for reading. Glycemic control as per PCP   There are no Patient Instructions on file for this visit.    Return in about 2 years (around 9/26/2024) for complete eye exam.

## 2023-06-02 ENCOUNTER — TELEPHONE (OUTPATIENT)
Dept: ONCOLOGY | Age: 68
End: 2023-06-02